# Patient Record
Sex: FEMALE | Race: OTHER | NOT HISPANIC OR LATINO
[De-identification: names, ages, dates, MRNs, and addresses within clinical notes are randomized per-mention and may not be internally consistent; named-entity substitution may affect disease eponyms.]

---

## 2017-08-18 ENCOUNTER — TRANSCRIPTION ENCOUNTER (OUTPATIENT)
Age: 62
End: 2017-08-18

## 2017-11-11 ENCOUNTER — TRANSCRIPTION ENCOUNTER (OUTPATIENT)
Age: 62
End: 2017-11-11

## 2017-12-08 ENCOUNTER — TRANSCRIPTION ENCOUNTER (OUTPATIENT)
Age: 62
End: 2017-12-08

## 2018-01-26 ENCOUNTER — TRANSCRIPTION ENCOUNTER (OUTPATIENT)
Age: 63
End: 2018-01-26

## 2018-08-16 ENCOUNTER — TRANSCRIPTION ENCOUNTER (OUTPATIENT)
Age: 63
End: 2018-08-16

## 2018-08-20 ENCOUNTER — TRANSCRIPTION ENCOUNTER (OUTPATIENT)
Age: 63
End: 2018-08-20

## 2018-10-17 PROBLEM — Z00.00 ENCOUNTER FOR PREVENTIVE HEALTH EXAMINATION: Status: ACTIVE | Noted: 2018-10-17

## 2018-11-02 ENCOUNTER — OUTPATIENT (OUTPATIENT)
Dept: OUTPATIENT SERVICES | Facility: HOSPITAL | Age: 63
LOS: 1 days | End: 2018-11-02
Payer: MEDICARE

## 2018-11-02 ENCOUNTER — APPOINTMENT (OUTPATIENT)
Dept: MRI IMAGING | Facility: CLINIC | Age: 63
End: 2018-11-02
Payer: MEDICARE

## 2018-11-02 DIAGNOSIS — R44.3 HALLUCINATIONS, UNSPECIFIED: ICD-10-CM

## 2018-11-02 PROCEDURE — 70551 MRI BRAIN STEM W/O DYE: CPT | Mod: 26

## 2018-11-02 PROCEDURE — 70551 MRI BRAIN STEM W/O DYE: CPT

## 2019-06-20 ENCOUNTER — INPATIENT (INPATIENT)
Facility: HOSPITAL | Age: 64
LOS: 3 days | Discharge: HOME | DRG: 918 | End: 2019-06-24
Attending: HOSPITALIST | Admitting: HOSPITALIST
Payer: MEDICARE

## 2019-06-20 VITALS
DIASTOLIC BLOOD PRESSURE: 84 MMHG | OXYGEN SATURATION: 95 % | SYSTOLIC BLOOD PRESSURE: 137 MMHG | RESPIRATION RATE: 18 BRPM | HEART RATE: 110 BPM

## 2019-06-20 LAB
ALBUMIN SERPL ELPH-MCNC: 4.4 G/DL — SIGNIFICANT CHANGE UP (ref 3.3–5)
ALP SERPL-CCNC: 115 U/L — SIGNIFICANT CHANGE UP (ref 40–120)
ALT FLD-CCNC: 39 U/L — SIGNIFICANT CHANGE UP (ref 10–45)
ANION GAP SERPL CALC-SCNC: 16 MMOL/L — SIGNIFICANT CHANGE UP (ref 5–17)
APTT BLD: 27.5 SEC — SIGNIFICANT CHANGE UP (ref 27.5–36.3)
AST SERPL-CCNC: 41 U/L — HIGH (ref 10–40)
BASOPHILS # BLD AUTO: 0.1 K/UL — SIGNIFICANT CHANGE UP (ref 0–0.2)
BASOPHILS NFR BLD AUTO: 0.4 % — SIGNIFICANT CHANGE UP (ref 0–2)
BILIRUB SERPL-MCNC: 0.4 MG/DL — SIGNIFICANT CHANGE UP (ref 0.2–1.2)
BLD GP AB SCN SERPL QL: NEGATIVE — SIGNIFICANT CHANGE UP
BUN SERPL-MCNC: 6 MG/DL — LOW (ref 7–23)
CALCIUM SERPL-MCNC: 9.7 MG/DL — SIGNIFICANT CHANGE UP (ref 8.4–10.5)
CHLORIDE SERPL-SCNC: 96 MMOL/L — SIGNIFICANT CHANGE UP (ref 96–108)
CO2 SERPL-SCNC: 22 MMOL/L — SIGNIFICANT CHANGE UP (ref 22–31)
CREAT SERPL-MCNC: 0.95 MG/DL — SIGNIFICANT CHANGE UP (ref 0.5–1.3)
EOSINOPHIL # BLD AUTO: 0.2 K/UL — SIGNIFICANT CHANGE UP (ref 0–0.5)
EOSINOPHIL NFR BLD AUTO: 1.3 % — SIGNIFICANT CHANGE UP (ref 0–6)
GAS PNL BLDV: SIGNIFICANT CHANGE UP
GLUCOSE SERPL-MCNC: 167 MG/DL — HIGH (ref 70–99)
HCT VFR BLD CALC: 35.2 % — SIGNIFICANT CHANGE UP (ref 34.5–45)
HGB BLD-MCNC: 12.1 G/DL — SIGNIFICANT CHANGE UP (ref 11.5–15.5)
INR BLD: 1.07 RATIO — SIGNIFICANT CHANGE UP (ref 0.88–1.16)
LYMPHOCYTES # BLD AUTO: 1.7 K/UL — SIGNIFICANT CHANGE UP (ref 1–3.3)
LYMPHOCYTES # BLD AUTO: 10.1 % — LOW (ref 13–44)
MCHC RBC-ENTMCNC: 33 PG — SIGNIFICANT CHANGE UP (ref 27–34)
MCHC RBC-ENTMCNC: 34.3 GM/DL — SIGNIFICANT CHANGE UP (ref 32–36)
MCV RBC AUTO: 96.3 FL — SIGNIFICANT CHANGE UP (ref 80–100)
MONOCYTES # BLD AUTO: 1.2 K/UL — HIGH (ref 0–0.9)
MONOCYTES NFR BLD AUTO: 7.3 % — SIGNIFICANT CHANGE UP (ref 2–14)
NEUTROPHILS # BLD AUTO: 13.7 K/UL — HIGH (ref 1.8–7.4)
NEUTROPHILS NFR BLD AUTO: 80.9 % — HIGH (ref 43–77)
PLATELET # BLD AUTO: 435 K/UL — HIGH (ref 150–400)
POTASSIUM SERPL-MCNC: 3.2 MMOL/L — LOW (ref 3.5–5.3)
POTASSIUM SERPL-SCNC: 3.2 MMOL/L — LOW (ref 3.5–5.3)
PROT SERPL-MCNC: 7.7 G/DL — SIGNIFICANT CHANGE UP (ref 6–8.3)
PROTHROM AB SERPL-ACNC: 12.3 SEC — SIGNIFICANT CHANGE UP (ref 10–12.9)
RBC # BLD: 3.66 M/UL — LOW (ref 3.8–5.2)
RBC # FLD: 12.8 % — SIGNIFICANT CHANGE UP (ref 10.3–14.5)
RH IG SCN BLD-IMP: POSITIVE — SIGNIFICANT CHANGE UP
SODIUM SERPL-SCNC: 134 MMOL/L — LOW (ref 135–145)
WBC # BLD: 16.9 K/UL — HIGH (ref 3.8–10.5)
WBC # FLD AUTO: 16.9 K/UL — HIGH (ref 3.8–10.5)

## 2019-06-20 PROCEDURE — 71045 X-RAY EXAM CHEST 1 VIEW: CPT | Mod: 26

## 2019-06-20 PROCEDURE — 70450 CT HEAD/BRAIN W/O DYE: CPT | Mod: 26

## 2019-06-20 PROCEDURE — 93010 ELECTROCARDIOGRAM REPORT: CPT

## 2019-06-20 PROCEDURE — 99285 EMERGENCY DEPT VISIT HI MDM: CPT | Mod: GC,25

## 2019-06-20 PROCEDURE — 71260 CT THORAX DX C+: CPT | Mod: 26

## 2019-06-20 PROCEDURE — 72125 CT NECK SPINE W/O DYE: CPT | Mod: 26

## 2019-06-20 PROCEDURE — 70486 CT MAXILLOFACIAL W/O DYE: CPT | Mod: 26

## 2019-06-20 PROCEDURE — 74177 CT ABD & PELVIS W/CONTRAST: CPT | Mod: 26

## 2019-06-20 PROCEDURE — 73060 X-RAY EXAM OF HUMERUS: CPT | Mod: 26,LT

## 2019-06-20 RX ORDER — SODIUM CHLORIDE 9 MG/ML
1000 INJECTION INTRAMUSCULAR; INTRAVENOUS; SUBCUTANEOUS ONCE
Refills: 0 | Status: COMPLETED | OUTPATIENT
Start: 2019-06-20 | End: 2019-06-20

## 2019-06-20 RX ADMIN — SODIUM CHLORIDE 1000 MILLILITER(S): 9 INJECTION INTRAMUSCULAR; INTRAVENOUS; SUBCUTANEOUS at 21:55

## 2019-06-20 NOTE — ED PROVIDER NOTE - PHYSICAL EXAMINATION
Venessa Saldana M.D.:   patient awake alert seen lying on stretcher, c-collar in place .   LUNGS CTAB no wheeze no crackle.   CARD tachycardic no m/r/g.    Abdomen soft NT ND no rebound no guarding no CVA tenderness.   EXT WWP no edema no calf tenderness CV 2+DP/PT bilaterally. +left sided chest wall tenderness chest wall stable pelvis stable no midline ctl spine tenderness  neuro A&Ox3 moving all extremities cn2-12 intact gait normal.    skin warm and dry no rash old bruises to right eye and left upper arm.  HEENT: moist mucous membranes, PERRL, EOMI

## 2019-06-20 NOTE — ED PROVIDER NOTE - CLINICAL SUMMARY MEDICAL DECISION MAKING FREE TEXT BOX
Dr. Dockery Note: fall with reported decreased ms (GCS13) but GCS=15 on ED arrival with signs of old contusion of face and possible overuse/overdose of TCA, will check EKG, ct scan for injuries, and frequent assessment.
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
## 2019-06-20 NOTE — ED PROVIDER NOTE - OBJECTIVE STATEMENT
Venessa Saldana M.D: 64yoF presenting as pre-notified level 2 trauma for unwitnessed fall with obvious head trauma, currently confused. on arrival pt awake states she tripped and fell in the bedroom and hit her head but did not lose consciosus. c/o some left sided chest and arm pain and a minor ehadache. arrives with c-collar in place.  A-intact  b-bl breaths oudns  C- intact pulses, normotensive  D-GCS15, pupils 3mm and reactive  E-small abrasion above left eye.

## 2019-06-20 NOTE — ED PROVIDER NOTE - ATTENDING CONTRIBUTION TO CARE
Pt with fall today, history limited from patient, see trauma flow sheet.  EMS reports bottle of 30 effexor pills bottle empty.  Pt states she took 2 today for "cold symptoms."  Pt with old contusion R face, no c/t/l spine td, clear lungs, slight tachycardia, tremulous with anxiety, abdomen soft, nt.  Level 2 trauma pre-activation given report of GCS13 with head injury (but GCS15 on initial assessment).  Pt with initial pulse ox 95% on RA, dipped down to 92% and 2L NC placed with sats 98%, clear lungs, initial CXR without PTX.
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
## 2019-06-21 DIAGNOSIS — F02.81 DEMENTIA IN OTHER DISEASES CLASSIFIED ELSEWHERE, UNSPECIFIED SEVERITY, WITH BEHAVIORAL DISTURBANCE: ICD-10-CM

## 2019-06-21 DIAGNOSIS — T50.901A POISONING BY UNSPECIFIED DRUGS, MEDICAMENTS AND BIOLOGICAL SUBSTANCES, ACCIDENTAL (UNINTENTIONAL), INITIAL ENCOUNTER: ICD-10-CM

## 2019-06-21 DIAGNOSIS — S09.90XA UNSPECIFIED INJURY OF HEAD, INITIAL ENCOUNTER: ICD-10-CM

## 2019-06-21 DIAGNOSIS — F32.9 MAJOR DEPRESSIVE DISORDER, SINGLE EPISODE, UNSPECIFIED: ICD-10-CM

## 2019-06-21 DIAGNOSIS — F05 DELIRIUM DUE TO KNOWN PHYSIOLOGICAL CONDITION: ICD-10-CM
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
## 2019-06-21 DIAGNOSIS — Z29.9 ENCOUNTER FOR PROPHYLACTIC MEASURES, UNSPECIFIED: ICD-10-CM

## 2019-06-21 DIAGNOSIS — F43.10 POST-TRAUMATIC STRESS DISORDER, UNSPECIFIED: ICD-10-CM

## 2019-06-21 LAB
ANION GAP SERPL CALC-SCNC: 15 MMOL/L — SIGNIFICANT CHANGE UP (ref 5–17)
APAP SERPL-MCNC: <15 UG/ML — SIGNIFICANT CHANGE UP (ref 10–30)
APPEARANCE UR: CLEAR — SIGNIFICANT CHANGE UP
BASOPHILS # BLD AUTO: 0.1 K/UL — SIGNIFICANT CHANGE UP (ref 0–0.2)
BASOPHILS NFR BLD AUTO: 0.6 % — SIGNIFICANT CHANGE UP (ref 0–2)
BILIRUB UR-MCNC: NEGATIVE — SIGNIFICANT CHANGE UP
BUN SERPL-MCNC: 5 MG/DL — LOW (ref 7–23)
CALCIUM SERPL-MCNC: 9.1 MG/DL — SIGNIFICANT CHANGE UP (ref 8.4–10.5)
CHLORIDE SERPL-SCNC: 98 MMOL/L — SIGNIFICANT CHANGE UP (ref 96–108)
CK SERPL-CCNC: 539 U/L — HIGH (ref 25–170)
CO2 SERPL-SCNC: 25 MMOL/L — SIGNIFICANT CHANGE UP (ref 22–31)
COLOR SPEC: SIGNIFICANT CHANGE UP
CREAT SERPL-MCNC: 0.68 MG/DL — SIGNIFICANT CHANGE UP (ref 0.5–1.3)
DIFF PNL FLD: NEGATIVE — SIGNIFICANT CHANGE UP
EOSINOPHIL # BLD AUTO: 0.2 K/UL — SIGNIFICANT CHANGE UP (ref 0–0.5)
EOSINOPHIL NFR BLD AUTO: 2.2 % — SIGNIFICANT CHANGE UP (ref 0–6)
ETHANOL SERPL-MCNC: SIGNIFICANT CHANGE UP MG/DL (ref 0–10)
FOLATE SERPL-MCNC: 18.2 NG/ML — SIGNIFICANT CHANGE UP
GLUCOSE SERPL-MCNC: 98 MG/DL — SIGNIFICANT CHANGE UP (ref 70–99)
GLUCOSE UR QL: NEGATIVE — SIGNIFICANT CHANGE UP
HCT VFR BLD CALC: 33.2 % — LOW (ref 34.5–45)
HGB BLD-MCNC: 11.5 G/DL — SIGNIFICANT CHANGE UP (ref 11.5–15.5)
KETONES UR-MCNC: NEGATIVE — SIGNIFICANT CHANGE UP
LEUKOCYTE ESTERASE UR-ACNC: NEGATIVE — SIGNIFICANT CHANGE UP
LYMPHOCYTES # BLD AUTO: 1.8 K/UL — SIGNIFICANT CHANGE UP (ref 1–3.3)
LYMPHOCYTES # BLD AUTO: 17.9 % — SIGNIFICANT CHANGE UP (ref 13–44)
MAGNESIUM SERPL-MCNC: 2.8 MG/DL — HIGH (ref 1.6–2.6)
MCHC RBC-ENTMCNC: 33.7 PG — SIGNIFICANT CHANGE UP (ref 27–34)
MCHC RBC-ENTMCNC: 34.5 GM/DL — SIGNIFICANT CHANGE UP (ref 32–36)
MCV RBC AUTO: 97.6 FL — SIGNIFICANT CHANGE UP (ref 80–100)
MONOCYTES # BLD AUTO: 1.1 K/UL — HIGH (ref 0–0.9)
MONOCYTES NFR BLD AUTO: 11.1 % — SIGNIFICANT CHANGE UP (ref 2–14)
NEUTROPHILS # BLD AUTO: 6.7 K/UL — SIGNIFICANT CHANGE UP (ref 1.8–7.4)
NEUTROPHILS NFR BLD AUTO: 68.1 % — SIGNIFICANT CHANGE UP (ref 43–77)
NITRITE UR-MCNC: NEGATIVE — SIGNIFICANT CHANGE UP
PH UR: 7 — SIGNIFICANT CHANGE UP (ref 5–8)
PHOSPHATE SERPL-MCNC: 2.2 MG/DL — LOW (ref 2.5–4.5)
PLATELET # BLD AUTO: 393 K/UL — SIGNIFICANT CHANGE UP (ref 150–400)
POTASSIUM SERPL-MCNC: 4.3 MMOL/L — SIGNIFICANT CHANGE UP (ref 3.5–5.3)
POTASSIUM SERPL-SCNC: 4.3 MMOL/L — SIGNIFICANT CHANGE UP (ref 3.5–5.3)
PROT UR-MCNC: NEGATIVE — SIGNIFICANT CHANGE UP
RBC # BLD: 3.4 M/UL — LOW (ref 3.8–5.2)
RBC # FLD: 13 % — SIGNIFICANT CHANGE UP (ref 10.3–14.5)
SALICYLATES SERPL-MCNC: <2 MG/DL — LOW (ref 15–30)
SODIUM SERPL-SCNC: 138 MMOL/L — SIGNIFICANT CHANGE UP (ref 135–145)
SP GR SPEC: 1.02 — SIGNIFICANT CHANGE UP (ref 1.01–1.02)
TSH SERPL-MCNC: 1.15 UIU/ML — SIGNIFICANT CHANGE UP (ref 0.27–4.2)
TSH SERPL-MCNC: 1.27 UIU/ML — SIGNIFICANT CHANGE UP (ref 0.27–4.2)
UROBILINOGEN FLD QL: NEGATIVE — SIGNIFICANT CHANGE UP
VIT B12 SERPL-MCNC: 1512 PG/ML — HIGH (ref 232–1245)
WBC # BLD: 9.9 K/UL — SIGNIFICANT CHANGE UP (ref 3.8–10.5)
WBC # FLD AUTO: 9.9 K/UL — SIGNIFICANT CHANGE UP (ref 3.8–10.5)

## 2019-06-21 PROCEDURE — 99221 1ST HOSP IP/OBS SF/LOW 40: CPT

## 2019-06-21 PROCEDURE — 99223 1ST HOSP IP/OBS HIGH 75: CPT

## 2019-06-21 PROCEDURE — 99222 1ST HOSP IP/OBS MODERATE 55: CPT

## 2019-06-21 RX ORDER — BUPROPION HYDROCHLORIDE 150 MG/1
1 TABLET, EXTENDED RELEASE ORAL
Qty: 0 | Refills: 0 | DISCHARGE

## 2019-06-21 RX ORDER — POTASSIUM CHLORIDE 20 MEQ
40 PACKET (EA) ORAL ONCE
Refills: 0 | Status: COMPLETED | OUTPATIENT
Start: 2019-06-21 | End: 2019-06-21

## 2019-06-21 RX ORDER — MAGNESIUM SULFATE 500 MG/ML
2 VIAL (ML) INJECTION ONCE
Refills: 0 | Status: COMPLETED | OUTPATIENT
Start: 2019-06-21 | End: 2019-06-21

## 2019-06-21 RX ORDER — LANOLIN ALCOHOL/MO/W.PET/CERES
3 CREAM (GRAM) TOPICAL ONCE
Refills: 0 | Status: COMPLETED | OUTPATIENT
Start: 2019-06-21 | End: 2019-06-21

## 2019-06-21 RX ORDER — ENOXAPARIN SODIUM 100 MG/ML
40 INJECTION SUBCUTANEOUS EVERY 24 HOURS
Refills: 0 | Status: DISCONTINUED | OUTPATIENT
Start: 2019-06-21 | End: 2019-06-24

## 2019-06-21 RX ORDER — NICOTINE POLACRILEX 2 MG
1 GUM BUCCAL DAILY
Refills: 0 | Status: DISCONTINUED | OUTPATIENT
Start: 2019-06-21 | End: 2019-06-24

## 2019-06-21 RX ORDER — DIVALPROEX SODIUM 500 MG/1
125 TABLET, DELAYED RELEASE ORAL EVERY 6 HOURS
Refills: 0 | Status: DISCONTINUED | OUTPATIENT
Start: 2019-06-21 | End: 2019-06-24

## 2019-06-21 RX ORDER — VENLAFAXINE HCL 75 MG
2 CAPSULE, EXT RELEASE 24 HR ORAL
Qty: 0 | Refills: 0 | DISCHARGE

## 2019-06-21 RX ORDER — SODIUM,POTASSIUM PHOSPHATES 278-250MG
1 POWDER IN PACKET (EA) ORAL ONCE
Refills: 0 | Status: COMPLETED | OUTPATIENT
Start: 2019-06-21 | End: 2019-06-21

## 2019-06-21 RX ADMIN — Medication 1 TABLET(S): at 21:21

## 2019-06-21 RX ADMIN — Medication 3 MILLIGRAM(S): at 21:21

## 2019-06-21 RX ADMIN — Medication 50 GRAM(S): at 04:02

## 2019-06-21 RX ADMIN — Medication 40 MILLIEQUIVALENT(S): at 02:40

## 2019-06-21 RX ADMIN — ENOXAPARIN SODIUM 40 MILLIGRAM(S): 100 INJECTION SUBCUTANEOUS at 16:34

## 2019-06-21 RX ADMIN — Medication 40 MILLIEQUIVALENT(S): at 05:20

## 2019-06-21 NOTE — BEHAVIORAL HEALTH ASSESSMENT NOTE - NSBHCHARTREVIEWIMAGING_PSY_A_CORE FT
EXAM:  CT MAXILLOFACIAL                        EXAM:  CT 3D RECONSTRUCT W LAURIE                        EXAM:  CT BRAIN                        EXAM:  CT CERVICAL SPINE                        PROCEDURE DATE:  06/20/2019    INTERPRETATION:  HISTORY: Trauma.  COMPARISON: None     TECHNIQUE: Axial noncontrast CT images of the head, cervical spine, and   face were obtained and submitted for interpretation. Sagittal and coronal   reformatted images were provided. Bone and soft tissue windows were   evaluated.    FINDINGS:     CT BRAIN:     There is no acute intracranial mass-effect, hemorrhage, midline shift, or   abnormal extra-axial fluid collection.     Ventricles, sulci, and cisterns are normal in size for the patient'martin   without evidence of hydrocephalus. Basal cisterns are patent.     Paranasal sinuses and mastoid air cells are clear. The calvarium is   intact.   CT CERVICAL SPINE:   Grade 1 retrolisthesis of C4 in relation to C3 and C5. Well-corticated   ossific density along the anterior margin of the C4 vertebral body due to   chronic fracture deformity. There is no prevertebral soft tissue   swelling.   Multilevel spondylosis in the mid cervical spine with disc osteophyte   complex formation and uncovertebral joint/facet degeneration noted.   Degenerative right greater than left C2-C3 fusion.  The atlantodental and atlanto-occipital joints are maintained. Remaining   articular facets and posterior elements alignment is maintained.   Partially imaged biapical pleural parenchymal thickening and ground glass   opacities.  CT FACE  There is no facial or orbital fracture. The globes are intact without   retrobulbar hematoma. The lenses are located bilaterally.  The mandibular condyles are located without fracture. The   temporomandibular joints are intact.   The paranasal sinuses are clear. The nasopharyngeal contours are   unremarkable.  IMPRESSION:   CT BRAIN: No acute intracranial bleeding, mass effect, or shift.   CT CERVICAL SPINE: No acute fracture. Grade 1 retrolisthesis of C4 in   relation to C3 and C5.  CT FACE: No facial or orbital fracture.

## 2019-06-21 NOTE — BEHAVIORAL HEALTH ASSESSMENT NOTE - NSBHCONSULTRECOMMENDOTHER_PSY_A_CORE FT
Continue to rule of causes of delirium:  Recommend to check: RPR  Avoid benzos. opioids (if possible) and anticholinergic as it may worsen confusion  Maintain sleep wake cycle  Provide frequent reorientation and redirection  Family member at bedside if possible  Assess for need for glasses and hearing aid (if applicable)

## 2019-06-21 NOTE — BEHAVIORAL HEALTH ASSESSMENT NOTE - NSBHCHARTREVIEWVS_PSY_A_CORE FT
ICU Vital Signs Last 24 Hrs  T(C): 36.9 (21 Jun 2019 12:28), Max: 36.9 (21 Jun 2019 12:28)  T(F): 98.5 (21 Jun 2019 12:28), Max: 98.5 (21 Jun 2019 12:28)  HR: 88 (21 Jun 2019 12:28) (75 - 110)  BP: 125/74 (21 Jun 2019 12:28) (100/60 - 137/84)  BP(mean): --  ABP: --  ABP(mean): --  RR: 18 (21 Jun 2019 12:28) (18 - 22)  SpO2: 95% (21 Jun 2019 12:28) (95% - 98%)

## 2019-06-21 NOTE — CONSULT NOTE ADULT - ASSESSMENT
64F brought by EMS after unwitnessed fall as level 2 trauma. CT head, c-spine, max-face, chest, abdomen, pelvis, and LUE x-ray ordered    - Will f/u imaging    Trauma Surgery  Pager [phone] 64F brought by EMS after unwitnessed fall as level 2 trauma.     - CT head, c-spine, max-face, chest, abdomen, pelvis, and LUE x-ray negative for acute injury  - Recommend social work and PT consult  - Will follow for tertiary exam  - Discussed with attending    Trauma Surgery  Pager [phone]

## 2019-06-21 NOTE — H&P ADULT - PROBLEM SELECTOR PLAN 1
-per patient's son's concerns, pt was given wellbutrin and effexor for 1 month but has less than <1 week supply left - as per ED note  -QTC was prolonged on admission, check EKG daily  -tele monitoring  -K and Mg repleted in ED, will check daily   -continue with 1:1   -will get psych consult

## 2019-06-21 NOTE — BEHAVIORAL HEALTH ASSESSMENT NOTE - NSBHCHARTREVIEWINVESTIGATE_PSY_A_CORE FT
Ventricular Rate 86 BPM    Atrial Rate 86 BPM    P-R Interval 114 ms    QRS Duration 98 ms    Q-T Interval 430 ms    QTC Calculation(Bezet) 514 ms    P Axis 54 degrees    R Axis 1 degrees    T Axis 31 degrees    Diagnosis Line NORMAL SINUS RHYTHM  CANNOT RULE OUT ANTERIOR INFARCT , AGE UNDETERMINED  ABNORMAL ECG

## 2019-06-21 NOTE — CONSULT NOTE ADULT - SUBJECTIVE AND OBJECTIVE BOX
Level 2 Trauma Activation    64F brought by EMS after unwitnessed fall with head injury. States she tripped in her bedroom and hit her head but was able to ambulate after the fall and did not lose consciousness. She is complaining of headache and left arm pain.    Primary Survey  A - airway intact  B - bilateral breath sounds, placed on supplemental oxygen for saturation 89%  C - initially BP: 137/84, palpable pulses in extremities  D - GCS 15 on arrival  Exposure obtained    Secondary survey  Gen: NAD  HEENT: No scalp hematoma, 1 cm laceration above left eye, right periorbital ecchymosis  Neck: Soft, no crepitus, trachea midline  Chest: L anterior chest wall tenderness, no ecchymosis, deformity,   Abd: Soft, ND, NT, no rebound, no guarding  Ext: All compartments soft, LUE with ecchymosis proximal to elbow, peripheral pulses palpable, moving all extremities, no other deformities, lacerations, abrasions, or signs of injury  Back: No TTP, no palpable runoff, stepoff, or deformity    Labs:                        12.1   16.9  )-----------( 435      ( 2019 22:10 )             35.2     06-20    134<L>  |  96  |  6<L>  ----------------------------<  167<H>  3.2<L>   |  22  |  0.95    Ca    9.7      2019 22:10    TPro  7.7  /  Alb  4.4  /  TBili  0.4  /  DBili  x   /  AST  41<H>  /  ALT  39  /  AlkPhos  115  06-20    PT/INR - ( 2019 22:10 )   PT: 12.3 sec;   INR: 1.07 ratio         PTT - ( 2019 22:10 )  PTT:27.5 sec  Urinalysis Basic - ( 2019 23:55 )    Color: Light Yellow / Appearance: Clear / S.024 / pH: x  Gluc: x / Ketone: Negative  / Bili: Negative / Urobili: Negative   Blood: x / Protein: Negative / Nitrite: Negative   Leuk Esterase: Negative / RBC: x / WBC x   Sq Epi: x / Non Sq Epi: x / Bacteria: x

## 2019-06-21 NOTE — BEHAVIORAL HEALTH ASSESSMENT NOTE - RISK ASSESSMENT
risk factors: Substance abuse history, changes of cognition, poor judgement      Protective factors: No Si/hi, no hx suicide attempts, future oriented, no prior psych admissions.
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
## 2019-06-21 NOTE — ED ADULT NURSE REASSESSMENT NOTE - NS ED NURSE REASSESS COMMENT FT1
Pt straight cath'd under sterile field, 800 mL clear yellow urine drained. Pt tolerated well. Xray notes extravasation in IV from IV contrast. Pt IV checked, flushes without difficulty, draws back blood. No swelling, bruising, tenderness noted to IV site. MD aware.

## 2019-06-21 NOTE — H&P ADULT - PROBLEM SELECTOR PLAN 2
-has been having multiple falls at home  -no fx here on imaging   -will check B12, vitamin D  -PT eval

## 2019-06-21 NOTE — CONSULT NOTE ADULT - ATTENDING COMMENTS
Pt initially seen and evaluated as Level 2 trauma activation. Agree with A/P. All imaging negative for acute traumatic injury. Social work. Will perform tertiary exam today.
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
## 2019-06-21 NOTE — BEHAVIORAL HEALTH ASSESSMENT NOTE - NSBHCHARTREVIEWLAB_PSY_A_CORE FT
CBC Full  -  ( 21 Jun 2019 07:05 )  WBC Count : 9.9 K/uL  RBC Count : 3.40 M/uL  Hemoglobin : 11.5 g/dL  Hematocrit : 33.2 %  Platelet Count - Automated : 393 K/uL  Mean Cell Volume : 97.6 fl  Mean Cell Hemoglobin : 33.7 pg  Mean Cell Hemoglobin Concentration : 34.5 gm/dL  Auto Neutrophil # : 6.7 K/uL  Auto Lymphocyte # : 1.8 K/uL  Auto Monocyte # : 1.1 K/uL  Auto Eosinophil # : 0.2 K/uL  Auto Basophil # : 0.1 K/uL  Auto Neutrophil % : 68.1 %  Auto Lymphocyte % : 17.9 %  Auto Monocyte % : 11.1 %  Auto Eosinophil % : 2.2 %  Auto Basophil % : 0.6 %                   11.5   9.9   )-----------( 393      ( 21 Jun 2019 07:05 )             33.2   06-21    138  |  98  |  5<L>  ----------------------------<  98  4.3   |  25  |  0.68    Ca    9.1      21 Jun 2019 07:04  Phos  2.2     06-21  Mg     2.8     06-21    TPro  7.7  /  Alb  4.4  /  TBili  0.4  /  DBili  x   /  AST  41<H>  /  ALT  39  /  AlkPhos  115  06-20

## 2019-06-21 NOTE — H&P ADULT - PROBLEM SELECTOR PLAN 3
-has history of depression/anxiety/PTSD  -will hold off on continuing effexor and wellbutrin at this time given concern for overdose, until psych sees patient

## 2019-06-21 NOTE — H&P ADULT - HISTORY OF PRESENT ILLNESS
64 year old female who presented to the ED after a fall at home. The patient states that she has been tripping frequently at home and that she fell. She states that she cannot remember all the details of her fall. She noticed after the fall that she had a dark circles around her right eye and discussed this with her son via phone. She then states that she was brought to the ED by a [employer] vehicle, but not an ambulance. She states that she was able to get up after the fall and call for help. She denies any headache, vision changes, nausea, vomiting, diarrhea, dysuria, fevers, chills, chest pain, chest pressure. She is unable to provide any further details or history pertaining to the fall. She states that her son was with her overnight in the ED. I tried contacting him at both [phone] as well as [phone] but I am unable to reach him. 64 year old female who presented to the ED after a fall at home. The patient states that she has been tripping frequently at home and that she fell. She states that she cannot remember all the details of her fall. She noticed after the fall that she had a dark circles around her right eye and discussed this with her son via phone. She then states that she was brought to the ED by a [employer] vehicle, but not an ambulance. She states that she was able to get up after the fall and call for help. She denies any headache, vision changes, nausea, vomiting, diarrhea, dysuria, fevers, chills, chest pain, chest pressure. She is unable to provide any further details or history pertaining to the fall. She states that her son was with her overnight in the ED. I tried contacting him at both [phone] as well as [phone] but I am unable to reach him.     Spoke with the patient's PCP Dr. Judi Hu ([phone]), who relayed that the patient has a history of depression, PTSD, and anxiety, and that she was seen in the office on June 17th for a fall. The patient had been carrying a laundry basket and tripped and fell, hit her head on the bed post. She had darkening around her R eye that was resolving at that time. She was complaining of trouble walking and R leg pain. An x-ray was ordered of the R leg but was never done. Meds verified with pt's pharmacy and PCP.

## 2019-06-21 NOTE — BEHAVIORAL HEALTH ASSESSMENT NOTE - NSBHCONSULTFOLLOWAFTERCARE_PSY_A_CORE FT
pt can f/u at [facility] [phone], pt does not need psych admission at this time. pt can benefit from  referral, home aid services? long term placement?

## 2019-06-21 NOTE — CHART NOTE - NSCHARTNOTEFT_GEN_A_CORE
TERTIARY TRAUMA SURVEY  ------------------------------------------------------------------------------------    Date of TTS:   Time:   Admit Date:    Trauma Activation:   Admit GCS: E-     V-     M-     HPI:  64 year old female who presented to the ED after a fall at home. The patient states that she has been tripping frequently at home and that she fell. She states that she cannot remember all the details of her fall. She noticed after the fall that she had a dark circles around her right eye and discussed this with her son via phone. She then states that she was brought to the ED by a [employer] vehicle, but not an ambulance. She states that she was able to get up after the fall and call for help. She denies any headache, vision changes, nausea, vomiting, diarrhea, dysuria, fevers, chills, chest pain, chest pressure. She is unable to provide any further details or history pertaining to the fall. She states that her son was with her overnight in the ED. I tried contacting him at both [phone] as well as [phone] but I am unable to reach him.     Spoke with the patient's PCP Dr. Judi Hu ([phone]), who relayed that the patient has a history of depression, PTSD, and anxiety, and that she was seen in the office on  for a fall. The patient had been carrying a laundry basket and tripped and fell, hit her head on the bed post. She had darkening around her R eye that was resolving at that time. She was complaining of trouble walking and R leg pain. An x-ray was ordered of the R leg but was never done. Meds verified with pt's pharmacy and PCP. (2019 09:02)      INTERVAL EVENTS: ***    PAST MEDICAL & SURGICAL HISTORY:  Anxiety  Post traumatic stress disorder (PTSD)  Depression    [] No significant past history as reviewed with the patient and family    FAMILY HISTORY:    [] Family history not pertinent as reviewed with the patient and family    ALLERGIES: penicillin (Rash)      CURRENT MEDICATIONS  MEDICATIONS (STANDING): enoxaparin Injectable 40 milliGRAM(s) SubCutaneous every 24 hours  nicotine -   7 mG/24Hr(s) Patch 1 patch Transdermal daily    MEDICATIONS (PRN):diVALproex  milliGRAM(s) Oral every 6 hours PRN severe agitation    -----------------------------------------------------------------------------------    VITAL SIGNS:  T(C): 36.9 (19 @ 12:28), Max: 36.9 (19 @ 12:28)  HR: 88 (19 @ 12:28) (75 - 110)  BP: 125/74 (19 @ 12:28) (100/60 - 137/84)  RR: 18 (19 @ 12:28) (18 - 22)  SpO2: 95% (19 @ 12:28) (95% - 98%)  CAPILLARY BLOOD GLUCOSE      POCT Blood Glucose.: 160 mg/dL (2019 21:51)    Drug Dosing Weight  Height (cm): 157.48 (2019 06:09)  Weight (kg): 63 (2019 06:09)  BMI (kg/m2): 25.4 (2019 06:09)  BSA (m2): 1.64 (2019 06:09)     @ 07:01  -   @ 16:12  --------------------------------------------------------  IN:    Oral Fluid: 360 mL  Total IN: 360 mL    OUT:  Total OUT: 0 mL    Total NET: 360 mL          PHYSICAL EXAM:  ***  General: NAD, Laying in bed comfortably, very conversive.  HEENT: EOMI. Appreciated finger rub bilaterally.   Neck: Soft, supple, full ROM. No cervical or paraspinal tenderness.   Cardio: RRR.   Resp: Good effort, CTAB.   Thorax: No chest wall tenderness.  GI/Abd: Soft, NT/ND.  Vascular: Extremities warm, bilateral radial pulses palpable, bilateral DP/PT palpable.  Skin: Intact, no breakdown.  Musculoskeletal: All 4 extremities moving spontaneously, no limitations. Full ROM of shoulders, elbows, wrists, fingers, knees, ankles bilaterally. No tenderness to palpation of joints or extremities.  Neuro: Strength 5/5 in all extremities bilaterally. Sensation to light touch intact in all extremities bilaterally.     LABS:  CBC ( @ 07:05)                              11.5                           9.9     )----------------(  393        68.1  % Neutrophils, 17.9  % Lymphocytes, ANC: 6.7                                 33.2<L>  CBC ( @ 22:10)                              12.1                           16.9<H>  )----------------(  435<H>     80.9<H>% Neutrophils, 10.1<L>% Lymphocytes, ANC: 13.7<H>                              35.2      BMP ( @ 07:04)             138     |  98      |  5<L>  		Ca++ --      Ca 9.1                ---------------------------------( 98    		Mg 2.8<H>             4.3     |  25      |  0.68  			Ph 2.2<L>  BMP ( 02:19)             --      |  --      |  --    		Ca++ --      Ca --                 ---------------------------------( --    		Mg 1.9                --      |  --      |  --    			Ph --        LFTs ( @ 22:10)      TPro 7.7 / Alb 4.4 / TBili 0.4 / DBili -- / AST 41<H> / ALT 39 / AlkPhos 115    Coags ( @ 22:10)  aPTT 27.5 / INR 1.07 / PT 12.3    Cardiac Markers ( 02:19)     HSTrop: -- / CKMB: -- / CK: 539      VBG ( @ 23:00)     7.44 / 37 / 29 / 25 / 1.4 / 56<L>%     Lactate: 2.3<H>      MICROBIOLOGY:  Urinalysis ( @ 23:55):     Color: Light Yellow / Appearance: Clear / S.024 / pH: 7.0 / Gluc: Negative / Ketones: Negative / Bili: Negative / Urobili: Negative / Protein :Negative / Nitrites: Negative / Leuk.Est: Negative / RBC:  / WBC:  / Sq Epi:  / Non Sq Epi:  / Bacteria            ------------------------------------------------------------------------------------------  RADIOLOGICAL FINDINGS REVIEW:  ***  CXR:   Pelvis Films:    C-Spine Films:   T/L/S Spine Films:   Extremity Films:   Head CT:   C-Spine CT:   Neck CT:   Chest CT:   ABD/Pelvis CT:   Other:     List Injuries Identified to Date:  ***  Depression, unspecified depression type  PTSD (post-traumatic stress disorder)  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  Delirium due to another medical condition  Prophylactic measure: Prophylactic measure  Depression: Depression  Fall, initial encounter: Fall, initial encounter  Overdose: Overdose      List Operative and Interventional Radiological Procedures: ***      Consults (Date):  [] Neurosurgery   [] Orthopedic Surgery  [] Spine Surgery  [] Plastic Surgery  [] ENT  [] Urology  [] PM&R  [] Social Work    INTERPRETATION/ASSESSMENT:   64y girl     PLAN:   -   - TERTIARY TRAUMA SURVEY  ------------------------------------------------------------------------------------    Date of TTS:   Time: 4:25pm   Admit Date:    Trauma Activation: Level 2  Admit GCS: E- 4    V- 5    M-6     HPI:  64 year old female who presented to the ED after a fall at home. The patient states that she has been tripping frequently at home and that she fell. She states that she cannot remember all the details of her fall. She noticed after the fall that she had a dark circles around her right eye and discussed this with her son via phone. She then states that she was brought to the ED by a [employer] vehicle, but not an ambulance. She states that she was able to get up after the fall and call for help. She denies any headache, vision changes, nausea, vomiting, diarrhea, dysuria, fevers, chills, chest pain, chest pressure. She is unable to provide any further details or history pertaining to the fall. She states that her son was with her overnight in the ED. I tried contacting him at both [phone] as well as [phone] but I am unable to reach him.     Spoke with the patient's PCP Dr. Judi Hu ([phone]), who relayed that the patient has a history of depression, PTSD, and anxiety, and that she was seen in the office on  for a fall. The patient had been carrying a laundry basket and tripped and fell, hit her head on the bed post. She had darkening around her R eye that was resolving at that time. She was complaining of trouble walking and R leg pain. An x-ray was ordered of the R leg but was never done. Meds verified with pt's pharmacy and PCP.      INTERVAL EVENTS:    PAST MEDICAL & SURGICAL HISTORY:  Anxiety  Post traumatic stress disorder (PTSD)  Depression    [x] No significant past history as reviewed with the patient and family    FAMILY HISTORY:    [x] Family history not pertinent as reviewed with the patient and family    ALLERGIES: penicillin (Rash)      CURRENT MEDICATIONS  MEDICATIONS (STANDING): enoxaparin Injectable 40 milliGRAM(s) SubCutaneous every 24 hours  nicotine -   7 mG/24Hr(s) Patch 1 patch Transdermal daily    MEDICATIONS (PRN):diVALproex  milliGRAM(s) Oral every 6 hours PRN severe agitation    -----------------------------------------------------------------------------------    VITAL SIGNS:  T(C): 36.9 (19 @ 12:28), Max: 36.9 (19 @ 12:28)  HR: 88 (19 @ 12:28) (75 - 110)  BP: 125/74 (19 @ 12:28) (100/60 - 137/84)  RR: 18 (19 @ 12:28) (18 - 22)  SpO2: 95% (19 @ 12:28) (95% - 98%)  CAPILLARY BLOOD GLUCOSE      POCT Blood Glucose.: 160 mg/dL (2019 21:51)    Drug Dosing Weight  Height (cm): 157.48 (2019 06:09)  Weight (kg): 63 (2019 06:09)  BMI (kg/m2): 25.4 (2019 06:09)  BSA (m2): 1.64 (2019 06:09)     @ 07:01  -   @ 16:12  --------------------------------------------------------  IN:    Oral Fluid: 360 mL  Total IN: 360 mL    OUT:  Total OUT: 0 mL    Total NET: 360 mL          PHYSICAL EXAM:   General: NAD, Laying in bed comfortably, very conversive.  HEENT: 1 cm laceration above left eye, right periorbital ecchymosis EOMI. Appreciated finger rub bilaterally.   Neck: Soft, supple, full ROM. No cervical or paraspinal tenderness.   Cardio: RRR.   Resp: Good effort, CTAB.   Thorax: Mild L anterior chest wall tenderness  GI/Abd: Soft, NT/ND.  Vascular: Extremities warm, bilateral radial pulses palpable, bilateral DP/PT palpable.  Skin: LUE with ecchymosis proximal to elbow otherwise skin is intact, with no breakdown.   Musculoskeletal: All 4 extremities moving spontaneously, no limitations. Full ROM of shoulders, elbows, wrists, fingers, knees, ankles bilaterally. No tenderness to palpation of joints or extremities.  Neuro: Strength 5/5 in all extremities bilaterally. Sensation to light touch intact in all extremities bilaterally.     LABS:  CBC ( @ 07:05)                              11.5                           9.9     )----------------(  393        68.1  % Neutrophils, 17.9  % Lymphocytes, ANC: 6.7                                 33.2<L>  CBC ( @ 22:10)                              12.1                           16.9<H>  )----------------(  435<H>     80.9<H>% Neutrophils, 10.1<L>% Lymphocytes, ANC: 13.7<H>                              35.2      BMP ( @ 07:04)             138     |  98      |  5<L>  		Ca++ --      Ca 9.1                ---------------------------------( 98    		Mg 2.8<H>             4.3     |  25      |  0.68  			Ph 2.2<L>  BMP ( @ 02:19)             --      |  --      |  --    		Ca++ --      Ca --                 ---------------------------------( --    		Mg 1.9                --      |  --      |  --    			Ph --        LFTs ( @ 22:10)      TPro 7.7 / Alb 4.4 / TBili 0.4 / DBili -- / AST 41<H> / ALT 39 / AlkPhos 115    Coags ( @ 22:10)  aPTT 27.5 / INR 1.07 / PT 12.3    Cardiac Markers ( @ 02:19)     HSTrop: -- / CKMB: -- / CK: 539      VBG ( @ 23:00)     7.44 / 37 / 29 / 25 / 1.4 / 56<L>%     Lactate: 2.3<H>      MICROBIOLOGY:  Urinalysis ( @ 23:55):     Color: Light Yellow / Appearance: Clear / S.024 / pH: 7.0 / Gluc: Negative / Ketones: Negative / Bili: Negative / Urobili: Negative / Protein :Negative / Nitrites: Negative / Leuk.Est: Negative / RBC:  / WBC:  / Sq Epi:  / Non Sq Epi:  / Bacteria            ------------------------------------------------------------------------------------------  RADIOLOGICAL FINDINGS REVIEW:  < from: CT Maxillofacial No Cont (19 @ 22:37) >    EXAM:  CT MAXILLOFACIAL                          EXAM:  CT 3D RECONSTRUCT ROSA M SULLIVAN                          EXAM:  CT BRAIN                          EXAM:  CT CERVICAL SPINE                            PROCEDURE DATE:  2019          INTERPRETATION:  HISTORY: Trauma.    COMPARISON: None     TECHNIQUE: Axial noncontrast CT images of the head, cervical spine, and   face were obtained and submitted for interpretation. Sagittal and coronal   reformatted images were provided. Bone and soft tissue windows were   evaluated.    FINDINGS:     CT BRAIN:     There is no acute intracranial mass-effect, hemorrhage, midline shift, or   abnormal extra-axial fluid collection.     Ventricles, sulci, and cisterns are normal in size for the patient'martin   without evidence of hydrocephalus. Basal cisterns are patent.     Paranasal sinuses and mastoid air cells are clear. The calvarium is   intact.     CT CERVICAL SPINE:     Grade 1 retrolisthesis of C4 in relation to C3 and C5. Well-corticated   ossific density along the anterior margin of the C4 vertebral body due to   chronic fracture deformity. There is no prevertebral soft tissue   swelling.     Multilevel spondylosis in the mid cervical spine with disc osteophyte   complex formation and uncovertebral joint/facet degeneration noted.   Degenerative right greater than left C2-C3 fusion.    The atlantodental and atlanto-occipital joints are maintained. Remaining   articular facets and posterior elements alignment is maintained.     Partially imaged biapical pleural parenchymal thickening and ground glass   opacities.    CT FACE    There is no facial or orbital fracture. The globes are intact without   retrobulbar hematoma. The lenses are located bilaterally.    The mandibular condyles are located without fracture. The   temporomandibular joints are intact.     The paranasal sinuses are clear. The nasopharyngeal contours are   unremarkable.    IMPRESSION:     CT BRAIN: No acute intracranial bleeding, mass effect, or shift.     CT CERVICAL SPINE: No acute fracture. Grade 1 retrolisthesis of C4 in   relation to C3 and C5.    CT FACE: No facial or orbital fracture.    < from: Xray Humerus, Left (19 @ 23:04) >    EXAM:  HUMERUS LEFT                            PROCEDURE DATE:  2019            INTERPRETATION:  CLINICAL INFORMATION: Left arm pain status post fall    TECHNIQUE: 2 views of the left arm    COMPARISON: None    FINDINGS:     Extravasation of contrast in the soft tissues adjacent to the antecubital    peripheral line. There is no acute fracture or dislocation. There is   mild-to-moderate left AC joint arthrosis.    IMPRESSION:     Extravasation of contrast in the soft tissues adjacent to the antecubital    peripheral line. Monitor for signs and symptoms of compartment syndrome.   Consider a new left antecubital peripheral line.    Dr. Gaspar discussed these findings with Dr. Saldana on 2019 12:13 AM   with read back.         < end of copied text >    < from: Xray Chest 1 View AP/PA (19 @ 22:04) >    EXAM:  XR CHEST AP OR PA 1V                            PROCEDURE DATE:  2019            INTERPRETATION:  CLINICAL INFORMATION: Trauma.    COMPARISON:  Chest x-ray 2017.    TECHNIQUE:   Frontal radiograph of the chest.     FINDINGS:    Left basilar linear atelectasis. The left lung apex was not captured in   this image. No right pneumothorax. The cardiac silhouette is   unremarkable. No acute osseous abnormality.    IMPRESSION: Left basilar linear atelectasis. The left lung apex was not   captured in this image. No right pneumothorax.      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     PLAN:   - No new injuries on tertiary exam  - CT head, c-spine, max-face, chest, abdomen, pelvis, and LUE x-ray negative for acute injury  - f/u psych recs  - f/u PT recs  - Dispo: as per primary team    Trauma Surgery  Pager [phone]

## 2019-06-21 NOTE — BEHAVIORAL HEALTH ASSESSMENT NOTE - DETAILS
Dementia in Mother Dementia (Mother), Father (Stroke) Adderall abuse from prior ADHD diagnosis. Current PCP denies  committed suicide 3 years ago, Pt found husbands body. Darkening from fall around her R eye Lower back pain
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
## 2019-06-21 NOTE — BEHAVIORAL HEALTH ASSESSMENT NOTE - OTHER PAST PSYCHIATRIC HISTORY (INCLUDE DETAILS REGARDING ONSET, COURSE OF ILLNESS, INPATIENT/OUTPATIENT TREATMENT)
PTSD (found  after completion of suicide), depression, and anxiety, no former psychiatric hospitalizations, currently prescribed Wellbutrin and Bupropion.    No prior hospitalizations, but Dr. Hu (PCP) has tried to encourage pt to seek psychiatric hospitalization on several occasions with no success. no hx suicide attempts.

## 2019-06-22 DIAGNOSIS — M54.5 LOW BACK PAIN: ICD-10-CM

## 2019-06-22 LAB
ANION GAP SERPL CALC-SCNC: 15 MMOL/L — SIGNIFICANT CHANGE UP (ref 5–17)
BUN SERPL-MCNC: 8 MG/DL — SIGNIFICANT CHANGE UP (ref 7–23)
CALCIUM SERPL-MCNC: 9.3 MG/DL — SIGNIFICANT CHANGE UP (ref 8.4–10.5)
CHLORIDE SERPL-SCNC: 104 MMOL/L — SIGNIFICANT CHANGE UP (ref 96–108)
CK SERPL-CCNC: 500 U/L — HIGH (ref 25–170)
CO2 SERPL-SCNC: 23 MMOL/L — SIGNIFICANT CHANGE UP (ref 22–31)
CREAT SERPL-MCNC: 0.57 MG/DL — SIGNIFICANT CHANGE UP (ref 0.5–1.3)
GLUCOSE SERPL-MCNC: 89 MG/DL — SIGNIFICANT CHANGE UP (ref 70–99)
HCV AB S/CO SERPL IA: 0.1 S/CO — SIGNIFICANT CHANGE UP (ref 0–0.99)
HCV AB SERPL-IMP: SIGNIFICANT CHANGE UP
LEAD BLD-MCNC: <1 UG/DL — SIGNIFICANT CHANGE UP (ref 0–4)
MAGNESIUM SERPL-MCNC: 2.3 MG/DL — SIGNIFICANT CHANGE UP (ref 1.6–2.6)
PHOSPHATE SERPL-MCNC: 2.4 MG/DL — LOW (ref 2.5–4.5)
POTASSIUM SERPL-MCNC: 3.9 MMOL/L — SIGNIFICANT CHANGE UP (ref 3.5–5.3)
POTASSIUM SERPL-SCNC: 3.9 MMOL/L — SIGNIFICANT CHANGE UP (ref 3.5–5.3)
SODIUM SERPL-SCNC: 142 MMOL/L — SIGNIFICANT CHANGE UP (ref 135–145)

## 2019-06-22 PROCEDURE — 99233 SBSQ HOSP IP/OBS HIGH 50: CPT

## 2019-06-22 RX ORDER — CYCLOBENZAPRINE HYDROCHLORIDE 10 MG/1
5 TABLET, FILM COATED ORAL ONCE
Refills: 0 | Status: COMPLETED | OUTPATIENT
Start: 2019-06-22 | End: 2019-06-22

## 2019-06-22 RX ORDER — ACETAMINOPHEN 500 MG
650 TABLET ORAL EVERY 6 HOURS
Refills: 0 | Status: DISCONTINUED | OUTPATIENT
Start: 2019-06-22 | End: 2019-06-24

## 2019-06-22 RX ADMIN — CYCLOBENZAPRINE HYDROCHLORIDE 5 MILLIGRAM(S): 10 TABLET, FILM COATED ORAL at 18:27

## 2019-06-22 RX ADMIN — Medication 650 MILLIGRAM(S): at 18:27

## 2019-06-22 RX ADMIN — ENOXAPARIN SODIUM 40 MILLIGRAM(S): 100 INJECTION SUBCUTANEOUS at 17:03

## 2019-06-22 RX ADMIN — DIVALPROEX SODIUM 125 MILLIGRAM(S): 500 TABLET, DELAYED RELEASE ORAL at 10:56

## 2019-06-22 RX ADMIN — Medication 650 MILLIGRAM(S): at 19:20

## 2019-06-22 RX ADMIN — Medication 1 PATCH: at 11:01

## 2019-06-22 RX ADMIN — Medication 1 PATCH: at 19:00

## 2019-06-22 NOTE — SBIRT NOTE ADULT - NSSBIRTDRGACTION/INTER_GEN_A_CORE
RD outreach for continued f/u support. Patient stated that he has been doing well and continues to exercise and choose healthier options when on the road. He stated that he was taking Basaglar in the morning and noticed his fasting numbers were elevated around 170-190 mg/dL. Stated that he went to  refill of prescription and pharmacy told patient to take Basaglar at night. Patient switched and seems to have lower numbers in the 160's mg/dL. Patient asked RD if he could be referred to a psychologist to speak to someone regarding depression. RD stated that she will notify [facility] who can refer him. Patient appreciative. Patient had no other questions/concerns at the time of call and will notify RD if any arise. RD will wait for patient return call to continue f/u. Will stay on care team for now and will notify [facility] of outreach. Positive reinforcement

## 2019-06-22 NOTE — PROGRESS NOTE ADULT - ATTENDING COMMENTS
Karson Das MD  Division of [facility] Medicine  Cell: [phone]  Pager: [phone]  Office: [phone]

## 2019-06-22 NOTE — PROGRESS NOTE ADULT - SUBJECTIVE AND OBJECTIVE BOX
Patient is a 64y old  Female who presents with a chief complaint of fall (2019 11:54)        SUBJECTIVE / OVERNIGHT EVENTS: Patient says she feels ok except some chronic lower back pain. She denies HA, dizziness, CP, SOB, or palpitations. She reports feeling depressed and tearful at times.       MEDICATIONS  (STANDING):  enoxaparin Injectable 40 milliGRAM(s) SubCutaneous every 24 hours  nicotine -   7 mG/24Hr(s) Patch 1 patch Transdermal daily    MEDICATIONS  (PRN):  acetaminophen   Tablet .. 650 milliGRAM(s) Oral every 6 hours PRN Mild Pain (1 - 3)  diVALproex  milliGRAM(s) Oral every 6 hours PRN severe agitation      Vital Signs Last 24 Hrs  T(C): 36.8 (2019 20:40), Max: 36.8 (2019 15:53)  T(F): 98.2 (2019 20:40), Max: 98.2 (2019 15:53)  HR: 82 (2019 20:40) (82 - 90)  BP: 118/59 (2019 20:40) (118/59 - 134/71)  BP(mean): --  RR: 18 (2019 20:40) (18 - 18)  SpO2: 97% (2019 20:40) (96% - 97%)  CAPILLARY BLOOD GLUCOSE        I&O's Summary    2019 07:  -  2019 07:00  --------------------------------------------------------  IN: 840 mL / OUT: 0 mL / NET: 840 mL    2019 07:01  -  2019 21:47  --------------------------------------------------------  IN: 360 mL / OUT: 0 mL / NET: 360 mL          PHYSICAL EXAM:   GENERAL: NAD, well-developed  HEAD:  Right eye bruise.   EYES: Conjunctiva and sclera clear  NECK: Supple  CHEST/LUNG: Clear to auscultation bilaterally; No wheeze  HEART: S1S2 normal. Regular rate and rhythm; No murmurs, rubs, or gallops  ABDOMEN: Soft, Nontender, Nondistended; Bowel sounds present  EXTREMITIES:  No LE edema  PSYCH/Neuro: Awake and answers questions and follows commands. Otherwise appears non-focal. Becomes tearful at times during interview.    SKIN: No rashes or lesions      LABS:                        11.5   9.9   )-----------( 393      ( 2019 07:05 )             33.2     06-    142  |  104  |  8   ----------------------------<  89  3.9   |  23  |  0.57    Ca    9.3      2019 06:02  Phos  2.4       Mg     2.3         TPro  7.7  /  Alb  4.4  /  TBili  0.4  /  DBili  x   /  AST  41<H>  /  ALT  39  /  AlkPhos  115  06-20    PT/INR - ( 2019 22:10 )   PT: 12.3 sec;   INR: 1.07 ratio         PTT - ( 2019 22:10 )  PTT:27.5 sec  CARDIAC MARKERS ( 2019 06:02 )  x     / x     / 500 U/L / x     / x      CARDIAC MARKERS ( 2019 02:19 )  x     / x     / 539 U/L / x     / x          Urinalysis Basic - ( 2019 23:55 )    Color: Light Yellow / Appearance: Clear / S.024 / pH: x  Gluc: x / Ketone: Negative  / Bili: Negative / Urobili: Negative   Blood: x / Protein: Negative / Nitrite: Negative   Leuk Esterase: Negative / RBC: x / WBC x   Sq Epi: x / Non Sq Epi: x / Bacteria: x      Telemetry reviewed by me: Sinus 60-80's. PAT up to 130's for 10 secs.     RADIOLOGY & ADDITIONAL TESTS:    Imaging Personally Reviewed:  Consultant(s) Notes Reviewed:  Psych  Care Discussed with Consultants/Other Providers:

## 2019-06-23 LAB
ANION GAP SERPL CALC-SCNC: 14 MMOL/L — SIGNIFICANT CHANGE UP (ref 5–17)
BUN SERPL-MCNC: 10 MG/DL — SIGNIFICANT CHANGE UP (ref 7–23)
CALCIUM SERPL-MCNC: 9.2 MG/DL — SIGNIFICANT CHANGE UP (ref 8.4–10.5)
CHLORIDE SERPL-SCNC: 104 MMOL/L — SIGNIFICANT CHANGE UP (ref 96–108)
CO2 SERPL-SCNC: 25 MMOL/L — SIGNIFICANT CHANGE UP (ref 22–31)
CREAT SERPL-MCNC: 0.66 MG/DL — SIGNIFICANT CHANGE UP (ref 0.5–1.3)
GLUCOSE SERPL-MCNC: 119 MG/DL — HIGH (ref 70–99)
HCT VFR BLD CALC: 40 % — SIGNIFICANT CHANGE UP (ref 34.5–45)
HGB BLD-MCNC: 12.8 G/DL — SIGNIFICANT CHANGE UP (ref 11.5–15.5)
MAGNESIUM SERPL-MCNC: 2 MG/DL — SIGNIFICANT CHANGE UP (ref 1.6–2.6)
MCHC RBC-ENTMCNC: 31.1 PG — SIGNIFICANT CHANGE UP (ref 27–34)
MCHC RBC-ENTMCNC: 32 GM/DL — SIGNIFICANT CHANGE UP (ref 32–36)
MCV RBC AUTO: 97.3 FL — SIGNIFICANT CHANGE UP (ref 80–100)
PHOSPHATE SERPL-MCNC: 2.7 MG/DL — SIGNIFICANT CHANGE UP (ref 2.5–4.5)
PLATELET # BLD AUTO: 476 K/UL — HIGH (ref 150–400)
POTASSIUM SERPL-MCNC: 3.6 MMOL/L — SIGNIFICANT CHANGE UP (ref 3.5–5.3)
POTASSIUM SERPL-SCNC: 3.6 MMOL/L — SIGNIFICANT CHANGE UP (ref 3.5–5.3)
RBC # BLD: 4.11 M/UL — SIGNIFICANT CHANGE UP (ref 3.8–5.2)
RBC # FLD: 14.5 % — SIGNIFICANT CHANGE UP (ref 10.3–14.5)
SODIUM SERPL-SCNC: 143 MMOL/L — SIGNIFICANT CHANGE UP (ref 135–145)
WBC # BLD: 10.72 K/UL — HIGH (ref 3.8–10.5)
WBC # FLD AUTO: 10.72 K/UL — HIGH (ref 3.8–10.5)

## 2019-06-23 PROCEDURE — 99232 SBSQ HOSP IP/OBS MODERATE 35: CPT

## 2019-06-23 PROCEDURE — 93010 ELECTROCARDIOGRAM REPORT: CPT

## 2019-06-23 RX ORDER — ACETAMINOPHEN 500 MG
975 TABLET ORAL ONCE
Refills: 0 | Status: COMPLETED | OUTPATIENT
Start: 2019-06-23 | End: 2019-06-23

## 2019-06-23 RX ORDER — LIDOCAINE 4 G/100G
1 CREAM TOPICAL ONCE
Refills: 0 | Status: COMPLETED | OUTPATIENT
Start: 2019-06-23 | End: 2019-06-23

## 2019-06-23 RX ADMIN — Medication 650 MILLIGRAM(S): at 08:08

## 2019-06-23 RX ADMIN — ENOXAPARIN SODIUM 40 MILLIGRAM(S): 100 INJECTION SUBCUTANEOUS at 17:39

## 2019-06-23 RX ADMIN — Medication 650 MILLIGRAM(S): at 08:30

## 2019-06-23 RX ADMIN — Medication 1 PATCH: at 11:30

## 2019-06-23 RX ADMIN — LIDOCAINE 1 PATCH: 4 CREAM TOPICAL at 23:19

## 2019-06-23 RX ADMIN — Medication 975 MILLIGRAM(S): at 23:42

## 2019-06-23 RX ADMIN — Medication 650 MILLIGRAM(S): at 17:05

## 2019-06-23 RX ADMIN — Medication 975 MILLIGRAM(S): at 23:12

## 2019-06-23 RX ADMIN — Medication 1 PATCH: at 19:13

## 2019-06-23 RX ADMIN — Medication 650 MILLIGRAM(S): at 16:35

## 2019-06-23 RX ADMIN — Medication 1 PATCH: at 11:22

## 2019-06-23 NOTE — PROGRESS NOTE BEHAVIORAL HEALTH - NSBHCHARTREVIEWIMAGING_PSY_A_CORE FT
< from: CT Head No Cont (06.20.19 @ 22:32) >    CT BRAIN:     There is no acute intracranial mass-effect, hemorrhage, midline shift, or   abnormal extra-axial fluid collection.     Ventricles, sulci, and cisterns are normal in size for the patient'martin   without evidence of hydrocephalus. Basal cisterns are patent.     Paranasal sinuses and mastoid air cells are clear. The calvarium is   intact.     < end of copied text >

## 2019-06-23 NOTE — PROGRESS NOTE BEHAVIORAL HEALTH - NSBHCHARTREVIEWLAB_PSY_A_CORE FT
12.8   10.72 )-----------( 476      ( 23 Jun 2019 08:39 )             40.0     06-23    143  |  104  |  10  ----------------------------<  119<H>  3.6   |  25  |  0.66    Ca    9.2      23 Jun 2019 06:39  Phos  2.7     06-23  Mg     2.0     06-23
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
## 2019-06-23 NOTE — PROGRESS NOTE BEHAVIORAL HEALTH - NSBHCHARTREVIEWVS_PSY_A_CORE FT
Vital Signs Last 24 Hrs  T(C): 36.7 (23 Jun 2019 11:10), Max: 36.8 (22 Jun 2019 20:40)  T(F): 98.1 (23 Jun 2019 11:10), Max: 98.2 (22 Jun 2019 20:40)  HR: 84 (23 Jun 2019 11:10) (82 - 100)  BP: 102/67 (23 Jun 2019 11:10) (102/67 - 127/74)  BP(mean): --  RR: 17 (23 Jun 2019 11:10) (17 - 18)  SpO2: 97% (23 Jun 2019 11:10) (96% - 97%)

## 2019-06-23 NOTE — PROGRESS NOTE ADULT - ATTENDING COMMENTS
Karson Das MD  Division of [facility]  Cell: [phone]  Pager: [phone]  Office: [phone]

## 2019-06-23 NOTE — PROGRESS NOTE BEHAVIORAL HEALTH - NSBHFUPINTERVALHXFT_PSY_A_CORE
Pt seen and examined. She is oriented x4 (but states "that's because they just asked me those questions this morning.") Pt notes she has experienced subjective difficulty with her memory, though improved from the beginning of her hospitalization. Pt continues to endorse depressed mood, becoming tearful and irritable at many points throughout the interview. Pt perseverates on her strained relationships with her children and the anger/resentment she feels toward her  . She endorses guilt over being hostile towards her son. She admits that she misused her medications, which led her to be hospitalized. She feels that she "knows what I did wrong., so I won't do it now." She denies SI stating she would "never do what my  did" because she sees it as weak. She denies AH/VH. Pt notes she wants to get help, but insists that she does not want to be psychiatrically hospitalized, as she is concerned about the stigma. Pt open to idea of PHP.     Collateral obtained from son (Mo [phone]). He states mother had history of depression and had been on an antidepressant prior to death of her  3 years ago, but that her symptoms have worsened since then. After his death, she was prescribed a lot of medication that "basically put her in a coma." Meds included gapapentin, klonopin, adderall. Pt advocated for discontinuing of most meds, until she was only on antidepressants. Son has noted "episodes" when she "ramps up" and has decreased sleep for days at a time. During these episodes, she becomes paranoid and delusional, but after they are over, she has no recollection of these events. It is unclear if these episodes coincide with substance misuse. Son denies history of manic episodes or psychosis prior to a few years ago. He is concerned she has been drinking Red Bull and 5 hr energy drinks in excess, as well as abusing OTC sleeping pills. Pt has limited support, as all her family and friends have  over the past 10 years and Mo is her only support. At baseline, pt is very depressed, but son denies any history of suicidal statements. Though son would like her to be hospitalized, he does not have concern that pt would harm herself or others or be unable to care for herself if discharged. He has an appointment scheduled for her on 7/3 with her outpatient psychiatrist but is willing to see if he can obtain an earlier appointment.

## 2019-06-23 NOTE — PHYSICAL THERAPY INITIAL EVALUATION ADULT - PERTINENT HX OF CURRENT PROBLEM, REHAB EVAL
64 year old female admitted to [facility] on 6/21/19 with history of Depression, anxiety and PTSD admitted after a fall: 64 year old female admitted to [facility] on 6/21/19 with history of Depression, anxiety and PTSD admitted after a fall: CT head (-), CT c/s spine, (-), grade 1 retrolithiasis of C4 in relation to C3 and C5, Xray humerus, CT face (-) CT A/ P (-)

## 2019-06-23 NOTE — PROGRESS NOTE BEHAVIORAL HEALTH - NSBHCHARTREVIEWINVESTIGATE_PSY_A_CORE FT
< from: 12 Lead ECG (06.21.19 @ 02:07) >      Ventricular Rate 86 BPM    Atrial Rate 86 BPM    P-R Interval 114 ms    QRS Duration 98 ms    Q-T Interval 430 ms    QTC Calculation(Bezet) 514 ms    P Axis 54 degrees    R Axis 1 degrees    T Axis 31 degrees    Diagnosis Line NORMAL SINUS RHYTHM  CANNOT RULE OUT ANTERIOR INFARCT , AGE UNDETERMINED  ABNORMAL ECG    < end of copied text >

## 2019-06-23 NOTE — PHYSICAL THERAPY INITIAL EVALUATION ADULT - ADDITIONAL COMMENTS
lives w/ was living w/ son in private home w/ only one step to enter, and now will move to [facility], (-) steps, / no visual deficits, hearing good, R handed

## 2019-06-23 NOTE — CHART NOTE - NSCHARTNOTEFT_GEN_A_CORE
Karson Das | Reference #: [id number]     Others' Prescriptions    Patient Name: Judi Davis YOB: 1955   Address: [address] [place] [address] Sex: Female     Rx Written    Rx Dispensed    Drug    Quantity    Days Supply    Prescriber Name    07/31/2018 08/02/2018 adderall 10 mg tablet  90 30 Angélica Berger     07/21/2018 07/21/2018 dextroamp-amphetamin 10 mg tab  36 12 Angélica Berger     07/11/2018 07/12/2018 adderall 10 mg tablet  36 12 Angélica Berger     07/01/2018 07/01/2018 adderall 10 mg tablet  36 12 Angélica Berger

## 2019-06-23 NOTE — PROGRESS NOTE ADULT - SUBJECTIVE AND OBJECTIVE BOX
Patient is a 64y old  Female who presents with a chief complaint of fall (23 Jun 2019 11:44)        SUBJECTIVE / OVERNIGHT EVENTS: Patient says she still feels depressed. She becomes tearful. She denies CP, SOB, or palpitations. She says she didn't sleep well.       MEDICATIONS  (STANDING):  enoxaparin Injectable 40 milliGRAM(s) SubCutaneous every 24 hours  nicotine -   7 mG/24Hr(s) Patch 1 patch Transdermal daily    MEDICATIONS  (PRN):  acetaminophen   Tablet .. 650 milliGRAM(s) Oral every 6 hours PRN Mild Pain (1 - 3)  diVALproex  milliGRAM(s) Oral every 6 hours PRN severe agitation      Vital Signs Last 24 Hrs  T(C): 36.8 (23 Jun 2019 20:34), Max: 36.8 (23 Jun 2019 00:00)  T(F): 98.2 (23 Jun 2019 20:34), Max: 98.2 (23 Jun 2019 00:00)  HR: 92 (23 Jun 2019 20:34) (83 - 100)  BP: 116/74 (23 Jun 2019 20:34) (102/67 - 127/74)  BP(mean): --  RR: 18 (23 Jun 2019 20:34) (17 - 18)  SpO2: 97% (23 Jun 2019 20:34) (96% - 97%)  CAPILLARY BLOOD GLUCOSE        I&O's Summary    22 Jun 2019 07:01  -  23 Jun 2019 07:00  --------------------------------------------------------  IN: 360 mL / OUT: 0 mL / NET: 360 mL    23 Jun 2019 07:01  -  23 Jun 2019 22:13  --------------------------------------------------------  IN: 840 mL / OUT: 0 mL / NET: 840 mL          PHYSICAL EXAM:   GENERAL: NAD, well-developed  HEAD:  Bruise over right eye.   EYES: Conjunctiva and sclera clear  NECK: Supple  CHEST/LUNG: Clear to auscultation bilaterally; No wheeze  HEART: S1S2 normal. Regular rate and rhythm; No murmurs, rubs, or gallops  ABDOMEN: Soft, Nontender, Nondistended; Bowel sounds present  EXTREMITIES: No LE edema  PSYCH/Neuro: Awake and answers questions and follows commands. Becomes tearful during interview. Oriented x 3+.   SKIN: No rashes or lesions      LABS:                        12.8   10.72 )-----------( 476      ( 23 Jun 2019 08:39 )             40.0     06-23    143  |  104  |  10  ----------------------------<  119<H>  3.6   |  25  |  0.66    Ca    9.2      23 Jun 2019 06:39  Phos  2.7     06-23  Mg     2.0     06-23        CARDIAC MARKERS ( 22 Jun 2019 06:02 )  x     / x     / 500 U/L / x     / x            Telemetry reviewed by me: Sinus 's.     RADIOLOGY & ADDITIONAL TESTS:    Imaging Personally Reviewed:   Consultant(s) Notes Reviewed: Psych  Care Discussed with Consultants/Other Providers: Psych resident Patient is a 64y old  Female who presents with a chief complaint of fall (23 Jun 2019 11:44)        SUBJECTIVE / OVERNIGHT EVENTS: Patient says she still feels depressed. She becomes tearful. She denies CP, SOB, or palpitations. She says she didn't sleep well. She reports her chronic back pain.       MEDICATIONS  (STANDING):  enoxaparin Injectable 40 milliGRAM(s) SubCutaneous every 24 hours  nicotine -   7 mG/24Hr(s) Patch 1 patch Transdermal daily    MEDICATIONS  (PRN):  acetaminophen   Tablet .. 650 milliGRAM(s) Oral every 6 hours PRN Mild Pain (1 - 3)  diVALproex  milliGRAM(s) Oral every 6 hours PRN severe agitation      Vital Signs Last 24 Hrs  T(C): 36.8 (23 Jun 2019 20:34), Max: 36.8 (23 Jun 2019 00:00)  T(F): 98.2 (23 Jun 2019 20:34), Max: 98.2 (23 Jun 2019 00:00)  HR: 92 (23 Jun 2019 20:34) (83 - 100)  BP: 116/74 (23 Jun 2019 20:34) (102/67 - 127/74)  BP(mean): --  RR: 18 (23 Jun 2019 20:34) (17 - 18)  SpO2: 97% (23 Jun 2019 20:34) (96% - 97%)  CAPILLARY BLOOD GLUCOSE        I&O's Summary    22 Jun 2019 07:01  -  23 Jun 2019 07:00  --------------------------------------------------------  IN: 360 mL / OUT: 0 mL / NET: 360 mL    23 Jun 2019 07:01  -  23 Jun 2019 22:13  --------------------------------------------------------  IN: 840 mL / OUT: 0 mL / NET: 840 mL          PHYSICAL EXAM:   GENERAL: NAD, well-developed  HEAD:  Bruise over right eye.   EYES: Conjunctiva and sclera clear  NECK: Supple  CHEST/LUNG: Clear to auscultation bilaterally; No wheeze  HEART: S1S2 normal. Regular rate and rhythm; No murmurs, rubs, or gallops  ABDOMEN: Soft, Nontender, Nondistended; Bowel sounds present  EXTREMITIES: No LE edema  PSYCH/Neuro: Awake and answers questions and follows commands. Becomes tearful during interview. Oriented x 3+.   SKIN: No rashes or lesions      LABS:                        12.8   10.72 )-----------( 476      ( 23 Jun 2019 08:39 )             40.0     06-23    143  |  104  |  10  ----------------------------<  119<H>  3.6   |  25  |  0.66    Ca    9.2      23 Jun 2019 06:39  Phos  2.7     06-23  Mg     2.0     06-23        CARDIAC MARKERS ( 22 Jun 2019 06:02 )  x     / x     / 500 U/L / x     / x            Telemetry reviewed by me: Sinus 's.     RADIOLOGY & ADDITIONAL TESTS:    Imaging Personally Reviewed:   Consultant(s) Notes Reviewed: Psych  Care Discussed with Consultants/Other Providers: Psych resident

## 2019-06-23 NOTE — CHART NOTE - NSCHARTNOTEFT_GEN_A_CORE
CC: lower back pain      HPI:  Notified by RN that patient complaining of lower back pain. Patient seen and assessed at bedside, NAD, alert and awake, tearful at time during the interview. Patient stated she feels "wound up" and upset because there were many visitors in the room earlier. She complained of lower back pain that radiates to the right leg stating "I have learned to deal with my pain." Patient stated she has had back pain for many years but it began to get worse after her  committed suicide 3 years ago. Pain is currently 10/10, constant, "whole", worse with movement. She endorsed numbness of the RLE associated with the pain. She denied weakness but also stated she has been ambulating less while in the hospital. She denied headache, dizziness, chest pain, acute dyspnea, nausea, vomiting, abdominal pain, or diarrhea.         ROS:  CONSTITUTIONAL:  No fever, chills, rigors  CARDIOVASCULAR:  No chest pain or palpitations  RESPIRATORY:   No SOB, cough, dyspnea on exertion.  No wheezing  GASTROINTESTINAL:  No abd pain, N/V, diarrhea/constipation  EXTREMITIES:  No swelling or joint pain  GENITOURINARY:  No burning on urination, increased frequency or urgency.  No flank pain  NEUROLOGIC:  No HA, visual disturbances  SKIN: No rashes        PAST MEDICAL & SURGICAL HISTORY:  Anxiety  Post traumatic stress disorder (PTSD)  Depression          Vital Signs Last 24 Hrs  T(C): 36.8 (23 Jun 2019 20:34), Max: 36.8 (23 Jun 2019 00:00)  T(F): 98.2 (23 Jun 2019 20:34), Max: 98.2 (23 Jun 2019 00:00)  HR: 92 (23 Jun 2019 20:34) (83 - 100)  BP: 116/74 (23 Jun 2019 20:34) (102/67 - 127/74)  RR: 18 (23 Jun 2019 20:34) (17 - 18)  SpO2: 97% (23 Jun 2019 20:34) (96% - 97%)      Physical Exam:  General: WN/WD NAD, AOx3, nontoxic appearing  Head:  NC/AT  CV: RRR, S1S2   Respiratory: CTA B/L, nonlabored  Abdominal: (+) bowel sounds x4. Soft, NT, ND, no palpable mass, no guarding, or rebound tenderness  Genitourinary: ? Martinez   MSK: No BLLE edema, + peripheral pulses, FROM all 4 extremity  Skin: (+) warm, dry   Psych: Appropriate affect       Labs:                        12.8   10.72 )-----------( 476      ( 23 Jun 2019 08:39 )             40.0     06-23    143  |  104  |  10  ----------------------------<  119<H>  3.6   |  25  |  0.66    Ca    9.2      23 Jun 2019 06:39  Phos  2.7     06-23  Mg     2.0     06-23        Urinalysis Basic - ( 06-20 @ 23:55 )  Color: -- / Appearance: Negative / SG: -- / pH: Negative  Gluc: Negative / Ketone: Light Yellow  / Bili: -- / Urobili: --   Blood: -- / Protein: -- / Nitrite: Negative   Leuk Esterase: Negative / RBC: 1.024 / WBC --   Sq Epi: Negative / Non Sq Epi: -- / Bacteria: 7.0            Radiology:          Assessment & Plan:  HPI:  64 year old female who presented to the ED after a fall at home. The patient states that she has been tripping frequently at home and that she fell. She states that she cannot remember all the details of her fall. She noticed after the fall that she had a dark circles around her right eye and discussed this with her son via phone. She then states that she was brought to the ED by a [employer] vehicle, but not an ambulance. She states that she was able to get up after the fall and call for help. She denies any headache, vision changes, nausea, vomiting, diarrhea, dysuria, fevers, chills, chest pain, chest pressure. She is unable to provide any further details or history pertaining to the fall. She states that her son was with her overnight in the ED. I tried contacting him at both [phone] as well as [phone] but I am unable to reach him.     Spoke with the patient's PCP Dr. Judi Hu ([phone]), who relayed that the patient has a history of depression, PTSD, and anxiety, and that she was seen in the office on June 17th for a fall. The patient had been carrying a laundry basket and tripped and fell, hit her head on the bed post. She had darkening around her R eye that was resolving at that time. She was complaining of trouble walking and R leg pain. An x-ray was ordered of the R leg but was never done. Meds verified with pt's pharmacy and PCP. (21 Jun 2019 09:02)      #Chronic lower back pain +/- sciatica  -No focal deficits on exam  -Lidocaine patch  -Acetaminophen 975mg PO x1  -Will continue to closely monitor patient/vitals  -Primary Team to follow up in AM, attending to follow       Sherly Rowell PA-C  Dept of Medicine CC: lower back pain      HPI:  Notified by RN that patient complaining of lower back pain. Patient seen and assessed at bedside, NAD, alert and awake, tearful at time during the interview. Patient stated she feels "wound up" and upset because there were many visitors in the room earlier. She complained of lower back pain that radiates to the right leg stating "I have learned to deal with my pain." Patient stated she has had back pain for many years but it began to get worse after her  committed suicide 3 years ago. Pain is currently 10/10, constant, "whole", worse with movement. She endorsed numbness of the RLE associated with the pain. She denied weakness but also stated she has been ambulating less while in the hospital. She denied headache, dizziness, chest pain, acute dyspnea, nausea, vomiting, abdominal pain, or diarrhea.         ROS:  CONSTITUTIONAL:  No fever, chills, rigors  CARDIOVASCULAR:  No chest pain or palpitations  RESPIRATORY:   No SOB, cough, dyspnea on exertion.  No wheezing  GASTROINTESTINAL:  No abd pain, N/V, diarrhea/constipation  EXTREMITIES:  No swelling or joint pain  GENITOURINARY:  No burning on urination, increased frequency or urgency.  No flank pain  NEUROLOGIC:  No HA, visual disturbances  SKIN: No rashes        PAST MEDICAL & SURGICAL HISTORY:  Anxiety  Post traumatic stress disorder (PTSD)  Depression          Vital Signs Last 24 Hrs  T(C): 36.8 (23 Jun 2019 20:34), Max: 36.8 (23 Jun 2019 00:00)  T(F): 98.2 (23 Jun 2019 20:34), Max: 98.2 (23 Jun 2019 00:00)  HR: 92 (23 Jun 2019 20:34) (83 - 100)  BP: 116/74 (23 Jun 2019 20:34) (102/67 - 127/74)  RR: 18 (23 Jun 2019 20:34) (17 - 18)  SpO2: 97% (23 Jun 2019 20:34) (96% - 97%)      Physical Exam:  General: WN/WD, NAD, AOx3, nontoxic appearing  Head:  (+) right periorbital ecchymosis  CV: RRR, S1S2   Respiratory: CTA B/L, nonlabored  Abdominal: (+) bowel sounds x4. Soft, NT, ND, no palpable mass, no guarding, or rebound tenderness  MSK: No BLLE edema, + peripheral pulses, FROM all 4 extremity  Motor exam: Bulk and tone were normal. Strength was 5/5 in all four extremities.  Sensation: Intact to light touch  Skin: (+) warm, dry   Psych: Tearful at times during interview        Labs:                        12.8   10.72 )-----------( 476      ( 23 Jun 2019 08:39 )             40.0     06-23    143  |  104  |  10  ----------------------------<  119<H>  3.6   |  25  |  0.66    Ca    9.2      23 Jun 2019 06:39  Phos  2.7     06-23  Mg     2.0     06-23        Urinalysis Basic - ( 06-20 @ 23:55 )  Color: -- / Appearance: Negative / SG: -- / pH: Negative  Gluc: Negative / Ketone: Light Yellow  / Bili: -- / Urobili: --   Blood: -- / Protein: -- / Nitrite: Negative   Leuk Esterase: Negative / RBC: 1.024 / WBC --   Sq Epi: Negative / Non Sq Epi: -- / Bacteria: 7.0        Assessment & Plan:  64 year old female with history of Depression, anxiety and PTSD admitted after a fall in setting of taking extra doses of psych meds.     Patient now presenting acutely with complaints of acute/chronic lower back pain. No focal deficits on exam. Patient also noted to have an episode of increased HR to 170s while ambulating to restroom, she was asymptomatic at the time.     #Chronic lower back pain +/- sciatica  -No focal deficits on exam  -Lidocaine patch  -Acetaminophen 975mg PO x1  -Avoid opiates at this time  -Fall precautions   -Consider imaging if back pain persists/worsens/neurological deficits   -Will continue to closely monitor patient/vitals  -Primary Team to follow up in AM, attending to follow       Sherly Rowell PA-C  Dept of Medicine  [id number] CC: lower back pain      HPI:  Notified by RN that patient complaining of lower back pain. Patient seen and assessed at bedside, NAD, alert and awake, tearful at time during the interview. Patient stated she feels "wound up" and upset because there were many visitors in the room earlier. She complained of lower back pain that radiates to the right leg stating "I have learned to deal with my pain." Patient stated she has had back pain for many years but it began to get worse after her  committed suicide 3 years ago. Pain is currently 10/10, constant, "whole", worse with movement. She endorsed numbness of the RLE associated with the pain. She denied weakness but also stated she has been ambulating less while in the hospital. She denied headache, dizziness, chest pain, acute dyspnea, nausea, vomiting, abdominal pain, or diarrhea.         ROS:  CONSTITUTIONAL:  No fever, chills, rigors  CARDIOVASCULAR:  No chest pain or palpitations  RESPIRATORY:   No SOB, cough, wheezing  GASTROINTESTINAL:  No abd pain, N/V/D  NEUROLOGIC:  No HA, visual disturbances  SKIN: No rashes        PAST MEDICAL & SURGICAL HISTORY:  Anxiety  Post traumatic stress disorder (PTSD)  Depression          Vital Signs Last 24 Hrs  T(C): 36.8 (23 Jun 2019 20:34), Max: 36.8 (23 Jun 2019 00:00)  T(F): 98.2 (23 Jun 2019 20:34), Max: 98.2 (23 Jun 2019 00:00)  HR: 92 (23 Jun 2019 20:34) (83 - 100)  BP: 116/74 (23 Jun 2019 20:34) (102/67 - 127/74)  RR: 18 (23 Jun 2019 20:34) (17 - 18)  SpO2: 97% (23 Jun 2019 20:34) (96% - 97%)      Physical Exam:  General: WN/WD, NAD, AOx3, nontoxic appearing  Head:  (+) right periorbital ecchymosis  CV: RRR, S1S2   Respiratory: CTA B/L, nonlabored  Abdominal: (+) bowel sounds x4. Soft, NT, ND, no palpable mass, no guarding, or rebound tenderness  MSK: No BLLE edema, + peripheral pulses, FROM all 4 extremity  Motor exam: Bulk and tone were normal. Strength was 5/5 in all four extremities.  Sensation: Intact to light touch  Skin: (+) warm, dry   Psych: Tearful at times during interview        Labs:                        12.8   10.72 )-----------( 476      ( 23 Jun 2019 08:39 )             40.0     06-23    143  |  104  |  10  ----------------------------<  119<H>  3.6   |  25  |  0.66    Ca    9.2      23 Jun 2019 06:39  Phos  2.7     06-23  Mg     2.0     06-23        Urinalysis Basic - ( 06-20 @ 23:55 )  Color: -- / Appearance: Negative / SG: -- / pH: Negative  Gluc: Negative / Ketone: Light Yellow  / Bili: -- / Urobili: --   Blood: -- / Protein: -- / Nitrite: Negative   Leuk Esterase: Negative / RBC: 1.024 / WBC --   Sq Epi: Negative / Non Sq Epi: -- / Bacteria: 7.0        Assessment & Plan:  64 year old female with history of Depression, anxiety and PTSD admitted after a fall in setting of taking extra doses of psych meds.     Patient now presenting acutely with complaints of acute/chronic lower back pain. No focal deficits on exam. Patient also noted to have an episode of increased HR to 170s while ambulating to restroom, she was asymptomatic at the time.     #Chronic lower back pain +/- sciatica  -No focal deficits on exam  -Lidocaine patch  -Acetaminophen 975mg PO x1  -Avoid opiates at this time  -Fall precautions   -Consider imaging if back pain persists/worsens/neurological deficits   -Will continue to closely monitor patient/vitals  -Primary Team to follow up in AM, attending to follow       Sherly Rowell PA-C  Dept of Medicine  [id number]

## 2019-06-24 ENCOUNTER — TRANSCRIPTION ENCOUNTER (OUTPATIENT)
Age: 64
End: 2019-06-24

## 2019-06-24 VITALS
HEART RATE: 104 BPM | RESPIRATION RATE: 18 BRPM | SYSTOLIC BLOOD PRESSURE: 118 MMHG | OXYGEN SATURATION: 95 % | DIASTOLIC BLOOD PRESSURE: 61 MMHG

## 2019-06-24 LAB
24R-OH-CALCIDIOL SERPL-MCNC: 15 NG/ML — LOW (ref 30–80)
ANION GAP SERPL CALC-SCNC: 15 MMOL/L — SIGNIFICANT CHANGE UP (ref 5–17)
BASOPHILS # BLD AUTO: 0.08 K/UL — SIGNIFICANT CHANGE UP (ref 0–0.2)
BASOPHILS NFR BLD AUTO: 0.9 % — SIGNIFICANT CHANGE UP (ref 0–2)
BUN SERPL-MCNC: 11 MG/DL — SIGNIFICANT CHANGE UP (ref 7–23)
CALCIUM SERPL-MCNC: 9.6 MG/DL — SIGNIFICANT CHANGE UP (ref 8.4–10.5)
CHLORIDE SERPL-SCNC: 105 MMOL/L — SIGNIFICANT CHANGE UP (ref 96–108)
CO2 SERPL-SCNC: 24 MMOL/L — SIGNIFICANT CHANGE UP (ref 22–31)
CREAT SERPL-MCNC: 0.65 MG/DL — SIGNIFICANT CHANGE UP (ref 0.5–1.3)
EOSINOPHIL # BLD AUTO: 0.27 K/UL — SIGNIFICANT CHANGE UP (ref 0–0.5)
EOSINOPHIL NFR BLD AUTO: 2.9 % — SIGNIFICANT CHANGE UP (ref 0–6)
GLUCOSE SERPL-MCNC: 112 MG/DL — HIGH (ref 70–99)
HCT VFR BLD CALC: 40.2 % — SIGNIFICANT CHANGE UP (ref 34.5–45)
HGB BLD-MCNC: 13 G/DL — SIGNIFICANT CHANGE UP (ref 11.5–15.5)
IMM GRANULOCYTES NFR BLD AUTO: 0.8 % — SIGNIFICANT CHANGE UP (ref 0–1.5)
LYMPHOCYTES # BLD AUTO: 2.67 K/UL — SIGNIFICANT CHANGE UP (ref 1–3.3)
LYMPHOCYTES # BLD AUTO: 28.6 % — SIGNIFICANT CHANGE UP (ref 13–44)
MCHC RBC-ENTMCNC: 31.2 PG — SIGNIFICANT CHANGE UP (ref 27–34)
MCHC RBC-ENTMCNC: 32.3 GM/DL — SIGNIFICANT CHANGE UP (ref 32–36)
MCV RBC AUTO: 96.4 FL — SIGNIFICANT CHANGE UP (ref 80–100)
MONOCYTES # BLD AUTO: 0.92 K/UL — HIGH (ref 0–0.9)
MONOCYTES NFR BLD AUTO: 9.9 % — SIGNIFICANT CHANGE UP (ref 2–14)
NEUTROPHILS # BLD AUTO: 5.31 K/UL — SIGNIFICANT CHANGE UP (ref 1.8–7.4)
NEUTROPHILS NFR BLD AUTO: 56.9 % — SIGNIFICANT CHANGE UP (ref 43–77)
PLATELET # BLD AUTO: 499 K/UL — HIGH (ref 150–400)
POTASSIUM SERPL-MCNC: 3.7 MMOL/L — SIGNIFICANT CHANGE UP (ref 3.5–5.3)
POTASSIUM SERPL-SCNC: 3.7 MMOL/L — SIGNIFICANT CHANGE UP (ref 3.5–5.3)
RBC # BLD: 4.17 M/UL — SIGNIFICANT CHANGE UP (ref 3.8–5.2)
RBC # FLD: 14.6 % — HIGH (ref 10.3–14.5)
SODIUM SERPL-SCNC: 144 MMOL/L — SIGNIFICANT CHANGE UP (ref 135–145)
WBC # BLD: 9.32 K/UL — SIGNIFICANT CHANGE UP (ref 3.8–10.5)
WBC # FLD AUTO: 9.32 K/UL — SIGNIFICANT CHANGE UP (ref 3.8–10.5)

## 2019-06-24 PROCEDURE — 83655 ASSAY OF LEAD: CPT

## 2019-06-24 PROCEDURE — 84100 ASSAY OF PHOSPHORUS: CPT

## 2019-06-24 PROCEDURE — 85027 COMPLETE CBC AUTOMATED: CPT

## 2019-06-24 PROCEDURE — 74177 CT ABD & PELVIS W/CONTRAST: CPT

## 2019-06-24 PROCEDURE — 86900 BLOOD TYPING SEROLOGIC ABO: CPT

## 2019-06-24 PROCEDURE — 76377 3D RENDER W/INTRP POSTPROCES: CPT

## 2019-06-24 PROCEDURE — 80053 COMPREHEN METABOLIC PANEL: CPT

## 2019-06-24 PROCEDURE — 82607 VITAMIN B-12: CPT

## 2019-06-24 PROCEDURE — 70450 CT HEAD/BRAIN W/O DYE: CPT

## 2019-06-24 PROCEDURE — 84132 ASSAY OF SERUM POTASSIUM: CPT

## 2019-06-24 PROCEDURE — 82947 ASSAY GLUCOSE BLOOD QUANT: CPT

## 2019-06-24 PROCEDURE — 71045 X-RAY EXAM CHEST 1 VIEW: CPT

## 2019-06-24 PROCEDURE — 82550 ASSAY OF CK (CPK): CPT

## 2019-06-24 PROCEDURE — 99232 SBSQ HOSP IP/OBS MODERATE 35: CPT

## 2019-06-24 PROCEDURE — 83605 ASSAY OF LACTIC ACID: CPT

## 2019-06-24 PROCEDURE — 81003 URINALYSIS AUTO W/O SCOPE: CPT

## 2019-06-24 PROCEDURE — 86901 BLOOD TYPING SEROLOGIC RH(D): CPT

## 2019-06-24 PROCEDURE — 82435 ASSAY OF BLOOD CHLORIDE: CPT

## 2019-06-24 PROCEDURE — 84443 ASSAY THYROID STIM HORMONE: CPT

## 2019-06-24 PROCEDURE — 82565 ASSAY OF CREATININE: CPT

## 2019-06-24 PROCEDURE — 85730 THROMBOPLASTIN TIME PARTIAL: CPT

## 2019-06-24 PROCEDURE — 80307 DRUG TEST PRSMV CHEM ANLYZR: CPT

## 2019-06-24 PROCEDURE — 99239 HOSP IP/OBS DSCHRG MGMT >30: CPT

## 2019-06-24 PROCEDURE — 82306 VITAMIN D 25 HYDROXY: CPT

## 2019-06-24 PROCEDURE — 70486 CT MAXILLOFACIAL W/O DYE: CPT

## 2019-06-24 PROCEDURE — 86850 RBC ANTIBODY SCREEN: CPT

## 2019-06-24 PROCEDURE — 93010 ELECTROCARDIOGRAM REPORT: CPT

## 2019-06-24 PROCEDURE — 71260 CT THORAX DX C+: CPT

## 2019-06-24 PROCEDURE — 97161 PT EVAL LOW COMPLEX 20 MIN: CPT

## 2019-06-24 PROCEDURE — 36415 COLL VENOUS BLD VENIPUNCTURE: CPT

## 2019-06-24 PROCEDURE — 82330 ASSAY OF CALCIUM: CPT

## 2019-06-24 PROCEDURE — 84295 ASSAY OF SERUM SODIUM: CPT

## 2019-06-24 PROCEDURE — 99285 EMERGENCY DEPT VISIT HI MDM: CPT | Mod: 25

## 2019-06-24 PROCEDURE — 82962 GLUCOSE BLOOD TEST: CPT

## 2019-06-24 PROCEDURE — 72125 CT NECK SPINE W/O DYE: CPT

## 2019-06-24 PROCEDURE — 86803 HEPATITIS C AB TEST: CPT

## 2019-06-24 PROCEDURE — 82803 BLOOD GASES ANY COMBINATION: CPT

## 2019-06-24 PROCEDURE — 80048 BASIC METABOLIC PNL TOTAL CA: CPT

## 2019-06-24 PROCEDURE — 85610 PROTHROMBIN TIME: CPT

## 2019-06-24 PROCEDURE — 73060 X-RAY EXAM OF HUMERUS: CPT

## 2019-06-24 PROCEDURE — 51701 INSERT BLADDER CATHETER: CPT

## 2019-06-24 PROCEDURE — 82746 ASSAY OF FOLIC ACID SERUM: CPT

## 2019-06-24 PROCEDURE — 85014 HEMATOCRIT: CPT

## 2019-06-24 PROCEDURE — 83735 ASSAY OF MAGNESIUM: CPT

## 2019-06-24 PROCEDURE — 93005 ELECTROCARDIOGRAM TRACING: CPT | Mod: 76

## 2019-06-24 RX ORDER — CHOLECALCIFEROL (VITAMIN D3) 125 MCG
1000 CAPSULE ORAL
Qty: 0 | Refills: 0 | DISCHARGE
Start: 2019-06-24

## 2019-06-24 RX ORDER — IBUPROFEN 200 MG
600 TABLET ORAL ONCE
Refills: 0 | Status: COMPLETED | OUTPATIENT
Start: 2019-06-24 | End: 2019-06-24

## 2019-06-24 RX ORDER — CHOLECALCIFEROL (VITAMIN D3) 125 MCG
1000 CAPSULE ORAL DAILY
Refills: 0 | Status: DISCONTINUED | OUTPATIENT
Start: 2019-06-24 | End: 2019-06-24

## 2019-06-24 RX ADMIN — LIDOCAINE 1 PATCH: 4 CREAM TOPICAL at 11:41

## 2019-06-24 RX ADMIN — Medication 1 PATCH: at 11:41

## 2019-06-24 RX ADMIN — Medication 600 MILLIGRAM(S): at 11:40

## 2019-06-24 RX ADMIN — Medication 1 PATCH: at 09:09

## 2019-06-24 RX ADMIN — Medication 600 MILLIGRAM(S): at 12:40

## 2019-06-24 RX ADMIN — Medication 1 PATCH: at 11:42

## 2019-06-24 RX ADMIN — LIDOCAINE 1 PATCH: 4 CREAM TOPICAL at 09:08

## 2019-06-24 RX ADMIN — Medication 1000 UNIT(S): at 17:20

## 2019-06-24 NOTE — PROGRESS NOTE BEHAVIORAL HEALTH - SUMMARY
This is a 65 yo  female, domiciled alone in [place], with 2 living children,  ( committed suicide 3 years ago), retired (former seamstress), with PMH significant for chronic back pain; +PPH for PTSD (found  after completion of suicide), depression, and anxiety, no former psychiatric hospitalizations, currently prescribed Wellbutrin and Bupropion;  Pt presented to the ED after a fall at home. [facility] Psychiatry C/L was consulted after patient requested to speak to psychiatry, confusion, disorganized behavior as per team     Pt had been misusing medication (wellbutrin, bupropion, pain meds, sleeping pills) in order to "relieve mental pain." Pt's PCP and son also indicated past history of substance abuse (Adderall). On admission, pt was initially confused, disoriented, paranoid. In the setting of normal CT, normal UA, normal TSH, elevated B12, etc, pt most likely experiencing exacerbation of cognitive deficits due to drug misuse in the setting of early onset dementia.
This is a 65 yo  female, domiciled alone in [place], with 2 living children,  ( committed suicide 3 years ago), retired (former seamstress), with PMH significant for chronic back pain; +PPH for PTSD (found  after completion of suicide), depression, and anxiety, no former psychiatric hospitalizations, currently prescribed Wellbutrin and Bupropion;  Pt presented to the ED after a fall at home. [facility] Psychiatry C/L was consulted after patient requested to speak to psychiatry, confusion, disorganized behavior as per team     Pt had been misusing medication (wellbutrin, bupropion, pain meds, sleeping pills) in order to "relieve mental pain." Pt's PCP and son also indicated past history of substance abuse (Adderall). On admission, pt was initially confused, disoriented, paranoid. In the setting of normal CT, normal UA, normal TSH, elevated B12, etc, pt most likely experiencing exacerbation of cognitive deficits due to drug misuse in the setting of early onset dementia. Cognition now improved, but pt endorsing depression, labile mood. Pt denies SI and HI currently. Pt's PCP has encouraged pt to seek psychiatric hospitalization on several occasions with no success. Pt would benefit from voluntary admission. Pt currently refusing, but amenable to PHP. Will hold for reassessment as patient's family will speak to patient tonight and encourage hospitalization. If she refuses, family agreeable to make outpatient appt asap.

## 2019-06-24 NOTE — DISCHARGE NOTE PROVIDER - HOSPITAL COURSE
65 yo  female, domiciled alone in [place], with 2 living children,  ( committed suicide 3 years ago), retired (former seamstress), with PMH significant for chronic back pain; +PPH for PTSD (found  after completion of suicide), depression, and anxiety, no former psychiatric hospitalizations, currently prescribed Wellbutrin and Bupropion;  Pt presented to the ED after a fall at home.        Pt had been misusing medication (wellbutrin, bupropion, pain meds, sleeping pills) in order to "relieve mental pain." Pt's PCP and son also indicated past history of substance abuse (Adderall). On admission, pt was initially confused, disoriented, paranoid. In the setting of normal CT, normal UA, normal TSH, elevated B12, etc, pt most likely experiencing exacerbation of cognitive deficits due to drug misuse in the setting of early onset dementia.            Medically cleared for discharge    Cleared by Psych for discharge home - out patient follow up with private psych 65 yo  female, domiciled alone in [place], with 2 living children,  ( committed suicide 3 years ago), retired (former seamstress), with PMH significant for chronic back pain; +PPH for PTSD (found  after completion of suicide), depression, and anxiety, no former psychiatric hospitalizations, currently prescribed Wellbutrin and Bupropion;  Pt presented to the ED after a fall at home.        Pt had been misusing medication (wellbutrin, bupropion, pain meds, sleeping pills) in order to "relieve mental pain." Pt's PCP and son also indicated past history of substance abuse (Adderall). On admission, pt was initially confused, disoriented, paranoid. In the setting of normal CT, normal UA, normal TSH, elevated B12, etc, pt most likely experiencing exacerbation of cognitive deficits due to drug misuse in the setting of early onset dementia.            Medically cleared for discharge after monitoring on telemetry for QTc prolongation. Remaining metabolic/infectious work-up unrevealing. Chronic lower back pain treated with non-narcotic therapies such as acetaminophen and lidoderm patch. Seen by PT, recommending no needs at this time.    Cleared by Psych for discharge home - out patient follow up with private psych, Dr. Berger

## 2019-06-24 NOTE — PROGRESS NOTE BEHAVIORAL HEALTH - NSBHFUPINTERVALHXFT_PSY_A_CORE
Pt seen and examined. pt calm, denies paranoia, hallucinations. pt feels safe in the hospital. pt denies depression, no si/i. pt anxious to get back home. no prns given.

## 2019-06-24 NOTE — PROVIDER CONTACT NOTE (OTHER) - ASSESSMENT
Pt VSS. Pt c/o pain in back that radiates to legs. Pt states this is chronic pain and the previous doses of TYlenol do not relieve the pain.
Pt was ambulating to bathroom during event, pt denies any dizziness, lightheadedness or chest pain. VSS, please see flowsheet. Pt returned to SR 70-80's after event.
Pt AAOx3. VSS. Pt denies cp, SOB and palpitations. Pt was walking to the bathroom and RN was notified about an increase in the pts HR.

## 2019-06-24 NOTE — PROGRESS NOTE ADULT - PROBLEM SELECTOR PLAN 5
-lovenox for dvt ppx    No medical contraindication to discharge planning. Await psychiatry input regarding need for involuntary admission vs. outpatient f/u. If plan is for home, will await final PT recommendations and ensure close outpatient f/u.  Discharge Time: 40 minutes
-lovenox for dvt ppx
-lovenox for dvt ppx

## 2019-06-24 NOTE — PROGRESS NOTE ADULT - PROBLEM SELECTOR PLAN 1
-Per patient's son's concerns, pt was given wellbutrin and effexor for 1 month but had less than <1 week supply left - as per ED note.   -Patient admits she was taking extra pills to give herself "a boost", but denies trying to overdose or commit suicide.   -QTc was prolonged on admission; tele monitoring today in NSR  -Monitor lytes and replete PRN.   -continue with enhanced supervision for now  -F/u psych recs. Will continue to observe for now. May need voluntary inpatient psych admission if she agrees.
-Per patient's son's concerns, pt was given wellbutrin and effexor for 1 month but had less than <1 week supply left - as per ED note.   -Patient admits she was taking extra pills to give herself "a boost", but denies trying to overdose or commit suicide.   -QTc was prolonged on admission, check EKG daily  -tele monitoring  -Monitor lytes and replete PRN.   -continue with 1:1 for now  -F/u psych recs.
-Per patient's son's concerns, pt was given wellbutrin and effexor for 1 month but had less than <1 week supply left - as per ED note.   -Patient admits she was taking extra pills to give herself "a boost", but denies trying to overdose or commit suicide.   -QTc was prolonged on admission, check EKG daily  -tele monitoring  -Monitor lytes and replete PRN.   -continue with enhanced supervision for now  -F/u psych recs. Will continue to observe for now. May need voluntary inpatient psych admission if she agrees.

## 2019-06-24 NOTE — PROGRESS NOTE BEHAVIORAL HEALTH - SECONDARY DX1
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

## 2019-06-24 NOTE — PROGRESS NOTE ADULT - SUBJECTIVE AND OBJECTIVE BOX
[facility] Division of Hospital Medicine  Chris Denny MD  Pager (LANETTE-MELANIE, 3P-5P): [phone]  Other Times:  [phone]    Patient is a 64y old  Female who presents with a chief complaint of fall (23 Jun 2019 11:44)    SUBJECTIVE / OVERNIGHT EVENTS: Overnight, continued to complain of chronic lower back pain. Received tylenol and lidoderm patch without significant relief. That being said, notes that her sleep is improving. Denies numbness/tingling in lower extremities. No change in bowel/bladder habits. No chest pain. No SOB. No fever/chills.    MEDICATIONS  (STANDING):  enoxaparin Injectable 40 milliGRAM(s) SubCutaneous every 24 hours  nicotine -   7 mG/24Hr(s) Patch 1 patch Transdermal daily    MEDICATIONS  (PRN):  acetaminophen   Tablet .. 650 milliGRAM(s) Oral every 6 hours PRN Mild Pain (1 - 3)  diVALproex  milliGRAM(s) Oral every 6 hours PRN severe agitation      CAPILLARY BLOOD GLUCOSE        I&O's Summary    23 Jun 2019 07:01  -  24 Jun 2019 07:00  --------------------------------------------------------  IN: 1080 mL / OUT: 0 mL / NET: 1080 mL    24 Jun 2019 07:01  -  24 Jun 2019 10:48  --------------------------------------------------------  IN: 300 mL / OUT: 0 mL / NET: 300 mL        PHYSICAL EXAM:  Vital Signs Last 24 Hrs  T(C): 36.9 (24 Jun 2019 05:23), Max: 36.9 (24 Jun 2019 05:23)  T(F): 98.4 (24 Jun 2019 05:23), Max: 98.4 (24 Jun 2019 05:23)  HR: 81 (24 Jun 2019 05:23) (81 - 92)  BP: 110/69 (24 Jun 2019 05:23) (102/67 - 116/74)  BP(mean): --  RR: 17 (24 Jun 2019 05:23) (17 - 18)  SpO2: 97% (24 Jun 2019 05:23) (96% - 97%)  GENERAL: NAD, well-developed, comfortable in bed  EYES: EOMI, conjunctiva and sclera clear  NECK: Supple, No JVD  CHEST/LUNG: Clear to auscultation bilaterally; No wheeze  HEART: Regular rate and rhythm; No murmurs, rubs, or gallops  ABDOMEN: Soft, Nontender, Nondistended; Bowel sounds present  EXTREMITIES:  2+ Peripheral Pulses, No clubbing, cyanosis, or edema  PSYCH: AAOx3, somewhat anxious, but answering questions appropriately. Denies SI/HI. Denies hallucinations.  NEUROLOGY: non-focal, moves all four extremities.    LABS:                        13.0   9.32  )-----------( 499      ( 24 Jun 2019 08:21 )             40.2     06-24    144  |  105  |  11  ----------------------------<  112<H>  3.7   |  24  |  0.65    Ca    9.6      24 Jun 2019 05:41  Phos  2.7     06-23  Mg     2.0     06-23    RADIOLOGY & ADDITIONAL TESTS:    Imaging Personally Reviewed:    Consultant(s) Notes Reviewed:      Care Discussed with Consultants/Other Providers: KATIE Castro

## 2019-06-24 NOTE — PROVIDER CONTACT NOTE (OTHER) - SITUATION
Pt HR increased to 170s upon ambulation to bathroom. HR sustained 170s for 10 seconds then went to 130s. Pt HR increased to 170s upon ambulation to bathroom. HR sustained 170s for 10 seconds then went to 130s. Pt has a hx of increased HR during ambulation this admission.

## 2019-06-24 NOTE — PROGRESS NOTE ADULT - PROBLEM SELECTOR PLAN 3
-has history of depression/anxiety/PTSD  -will hold off on continuing effexor and wellbutrin at this time given concern for overdose  -F/u with psych regarding when/what to resume/start.   -Depakote PRN for agitation.  -Nicotine patch for history of smoking and smoking cessation counseling.
-has history of depression/anxiety/PTSD  -will hold off on continuing effexor and wellbutrin at this time given concern for overdose  -F/u with psych regarding when/what to resume/start.   -Depakote PRN for agitation.
-has history of depression/anxiety/PTSD  -will hold off on continuing effexor and wellbutrin at this time given concern for overdose  -F/u with psych regarding when/what to resume/start.   -Depakote PRN for agitation.  -Nicotine patch for history of smoking and smoking cessation counseling.

## 2019-06-24 NOTE — PROGRESS NOTE BEHAVIORAL HEALTH - NSBHCONSULTMEDAGITATION_PSY_A_CORE FT
depakote 125mg po/iv q6hr prn severe agitation due to prolonged qtc. rec recheck ekg.
depakote 125mg po/iv q6hr prn severe agitation due to prolonged qtc. rec recheck ekg.

## 2019-06-24 NOTE — DISCHARGE NOTE PROVIDER - NSDCCPCAREPLAN_GEN_ALL_CORE_FT
PRINCIPAL DISCHARGE DIAGNOSIS  Diagnosis: Fall, initial encounter  Assessment and Plan of Treatment: Fall, initial encounter      SECONDARY DISCHARGE DIAGNOSES  Diagnosis: Medication overdose, undetermined intent, initial encounter  Assessment and Plan of Treatment:     Diagnosis: Depression, unspecified depression type  Assessment and Plan of Treatment:     Diagnosis: Chronic low back pain, unspecified back pain laterality, unspecified whether sciatica present  Assessment and Plan of Treatment: Chronic low back pain, unspecified back pain laterality, unspecified whether sciatica present

## 2019-06-24 NOTE — PROGRESS NOTE BEHAVIORAL HEALTH - NSBHCHARTREVIEWLAB_PSY_A_CORE FT
13.0   9.32  )-----------( 499      ( 24 Jun 2019 08:21 )             40.2     06-24    144  |  105  |  11  ----------------------------<  112<H>  3.7   |  24  |  0.65    Ca    9.6      24 Jun 2019 05:41  Phos  2.7     06-23  Mg     2.0     06-23

## 2019-06-24 NOTE — PROVIDER CONTACT NOTE (OTHER) - ACTION/TREATMENT ORDERED:
NP aware. NP assessed pt. Order for 1X 975 mg Tylenol PO and Lidocaine patch. Will continue to monitor.
Will continue to monitor patient.
PA made aware. Will continue to monitor.

## 2019-06-24 NOTE — PROGRESS NOTE BEHAVIORAL HEALTH - RISK ASSESSMENT
Risk factors: Substance abuse history, changes of cognition, impulsivity, poor judgment, active mood episode, hopelessness, lack of social support  Protective factors: No Si/hi, no hx suicide attempts, future oriented, no prior psych admissions, good therapeutic alliance    pt overall low risk for suicide attempt
Risk factors: Substance abuse history, changes of cognition, impulsivity, poor judgment, active mood episode, hopelessness, lack of social support  Protective factors: No Si/hi, no hx suicide attempts, future oriented, no prior psych admissions, good therapeutic alliance

## 2019-06-24 NOTE — DISCHARGE NOTE PROVIDER - NSDCFUADDAPPT_GEN_ALL_CORE_FT
Make appointments to follow up with your physicians - bring all discharge paperwork including discharge medication list to follow up appointments

## 2019-06-24 NOTE — PROVIDER CONTACT NOTE (OTHER) - BACKGROUND
Pt admitted for unwitnessed fall at home.
Pt admitted for unwitnessed fall at home.
Pt admitted with Injury of the head, PTSD and depression, as well as possible overdose.

## 2019-06-24 NOTE — PROGRESS NOTE ADULT - PROBLEM SELECTOR PROBLEM 4
Chronic low back pain, unspecified back pain laterality, unspecified whether sciatica present

## 2019-06-24 NOTE — PROGRESS NOTE ADULT - ASSESSMENT
64 year old female with history of Depression, anxiety, PTSD and chronic LBP admitted after a fall in setting of taking extra doses of psych meds.
64 year old female with history of Depression, anxiety and PTSD admitted after a fall in setting of taking extra doses of psych meds.
64 year old female with history of Depression, anxiety and PTSD admitted after a fall in setting of taking extra doses of psych meds.

## 2019-06-24 NOTE — PROGRESS NOTE BEHAVIORAL HEALTH - NSBHCHARTREVIEWVS_PSY_A_CORE FT
Vital Signs Last 24 Hrs  T(C): 36.9 (24 Jun 2019 05:23), Max: 36.9 (24 Jun 2019 05:23)  T(F): 98.4 (24 Jun 2019 05:23), Max: 98.4 (24 Jun 2019 05:23)  HR: 81 (24 Jun 2019 05:23) (81 - 92)  BP: 110/69 (24 Jun 2019 05:23) (110/69 - 116/74)  BP(mean): --  RR: 17 (24 Jun 2019 05:23) (17 - 18)  SpO2: 97% (24 Jun 2019 05:23) (96% - 97%)

## 2019-06-24 NOTE — PROGRESS NOTE ADULT - PROBLEM SELECTOR PLAN 4
-Chronic LBP.   -Would avoid opiates for now.   -Tylenol PRN.   -Lidocaine patch added.  -Will give ibuprofen 600 mg PO x 1 today and assess response.
-Chronic LBP.   -Would avoid opiates for now.   -Tylenol PRN.   -Can also try lidocaine patch.
-Chronic LBP.   -Would avoid opiates for now.   -Tylenol PRN.   -Can also try lidocaine patch.

## 2019-06-24 NOTE — DISCHARGE NOTE NURSING/CASE MANAGEMENT/SOCIAL WORK - NSDCDPATPORTLINK_GEN_ALL_CORE
You can access the [facility] Patient Portal, offered by [facility], by registering with the following website: [email or web address]

## 2019-06-24 NOTE — PROGRESS NOTE ADULT - PROBLEM SELECTOR PLAN 2
-has been having multiple falls at home. Per patient, they were mechanical.   -no fx here on imaging   -B12 normal, Vitamin D moderately low. Start vitamin D 1000 IU PO daily.  -Orthostatics negative.  -Fall precautions.   -PT eval - await recommendations.
-has been having multiple falls at home  -no fx here on imaging   -B12 normal, will check vitamin D  -Will check orthostatics; so far negative.   -Fall precautions.   -PT elizabeth
-has been having multiple falls at home. Per patient, they were mechanical.   -no fx here on imaging   -B12 normal, Vitamin D - testing  -Will check orthostatics; so far negative.   -Fall precautions.   -PT marianneal

## 2019-07-04 ENCOUNTER — TRANSCRIPTION ENCOUNTER (OUTPATIENT)
Age: 64
End: 2019-07-04

## 2019-07-23 PROBLEM — F32.9 MAJOR DEPRESSIVE DISORDER, SINGLE EPISODE, UNSPECIFIED: Chronic | Status: ACTIVE | Noted: 2019-06-21

## 2019-07-23 PROBLEM — F43.10 POST-TRAUMATIC STRESS DISORDER, UNSPECIFIED: Chronic | Status: ACTIVE | Noted: 2019-06-21

## 2019-07-23 PROBLEM — F41.9 ANXIETY DISORDER, UNSPECIFIED: Chronic | Status: ACTIVE | Noted: 2019-06-21

## 2019-07-30 ENCOUNTER — OUTPATIENT (OUTPATIENT)
Dept: OUTPATIENT SERVICES | Facility: HOSPITAL | Age: 64
LOS: 1 days | End: 2019-07-30
Payer: MEDICARE

## 2019-07-30 ENCOUNTER — APPOINTMENT (OUTPATIENT)
Dept: MRI IMAGING | Facility: CLINIC | Age: 64
End: 2019-07-30
Payer: MEDICARE

## 2019-07-30 DIAGNOSIS — Z00.8 ENCOUNTER FOR OTHER GENERAL EXAMINATION: ICD-10-CM

## 2019-07-30 PROCEDURE — 73721 MRI JNT OF LWR EXTRE W/O DYE: CPT | Mod: 26,RT

## 2019-07-30 PROCEDURE — 72148 MRI LUMBAR SPINE W/O DYE: CPT | Mod: 26

## 2019-07-30 PROCEDURE — 72148 MRI LUMBAR SPINE W/O DYE: CPT

## 2019-07-30 PROCEDURE — 73721 MRI JNT OF LWR EXTRE W/O DYE: CPT

## 2020-09-24 ENCOUNTER — TRANSCRIPTION ENCOUNTER (OUTPATIENT)
Age: 65
End: 2020-09-24

## 2021-04-07 ENCOUNTER — APPOINTMENT (OUTPATIENT)
Dept: DISASTER EMERGENCY | Facility: OTHER | Age: 66
End: 2021-04-07

## 2023-04-22 NOTE — DISCHARGE NOTE PROVIDER - NSCORESITESY/N_GEN_A_CORE_RD
No Patient was seen and examined at bedside on 4/22/2023 at 11 am. Patient has no acute complaints. Denies SOB, abd pain, CP. ROS is otherwise negative. Vitals, labwork and pertinent imaging reviewed. Physical exam - NAD, AAO x 2, PERRLA, EOMI, MMM, supple neck, chest - CTA b/l, CV - rrr, s1s2, no m/r/g, abd - soft, NTND, + BS, ext - wwp, psych - normal affect, skin - no rash    Plan:  -PT eval  -Add on D - dimer and BNP although low likelihood  -Add ASA, consider increase of Statin  -Pt is likely medically ready for d/c today

## 2024-06-04 NOTE — PHYSICAL THERAPY INITIAL EVALUATION ADULT - NAME OF DISCHARGE PLANNER
Preparing for Surgery  Follow these instructions before the procedure:  Several days or weeks before your procedure    Ask your health care provider about:  Changing or stopping your regular medicines. This is especially important if you are taking diabetes medicines or blood thinners.  Taking medicines such as aspirin and ibuprofen. These medicines can thin your blood. Do not take these medicines unless your health care provider tells you to take them.  Taking over-the-counter medicines, vitamins, herbs, and supplements.    Contact your surgeon if you:  Develop a fever of more than 100.4°F (38°C) or other feelings of illness during the 48 hours before your surgery.  Have symptoms that get worse.  Have questions or concerns about your surgery.  If you are going home the same day of your surgery you will need to arrange for a responsible adult, age 18 years old or older, to drive you home from the hospital and stay with you for 24 hours. Verification of the  will be made prior to any procedure requiring sedation. You may not go home in a taxi or any form of public transportation by yourself.     Day before your procedure  Medication(s) you need to stop the day before your surgery: lisinopril     Use bactroban at 5 pm night before your surgery  24 hours before your procedure DO NOT drink alcoholic beverages or smoke.  24 hours before your procedure STOP taking Erectile Dysfunction medication (i.e.,Cialis, Viagra)   You may be asked to shower with a germ-killing soap.  Day of your procedure   NO MEDICATIONS ON AM OF SURGERY    8 hours before your procedure STOP all food, any dairy products, and full liquids. This includes hard candy, chewing gum or mints. This is extremely important to prevent serious complications.     Up to 2 hours before your scheduled arrival time, you may have clear liquids no cream, powder, or pulp of any kind. Safe options are water, black coffee, plain tea, soda, Gatorade/Powerade, clear  broth, apple juice.    2 hours before your scheduled arrival time, STOP drinking clear liquids.    You may need to take another shower with a germ-killing soap before you leave home in the morning. Do not use perfumes, colognes, or body lotions.  Wear comfortable loose-fitting clothing.  Remove all jewelry including body piercing and rings, dark colored nail polish, and make up prior to arrival at the hospital. Leave all valuables at home.   Bring your hearing aids if you rely on them.  Do not wear contact lenses. If you wear eyeglasses remember to bring a case to store them in while you are in surgery.  Do not use denture adhesives since you will be asked to remove them during your surgery.    You do not need to bring your home medications into the hospital.   Bring your sleep apnea device with you on the day of your surgery (if this applies to you).  If you wear portable oxygen, bring it with you.   If you are staying overnight, you may bring a bag of items you may need such as slippers, robe and a change of clothes for your discharge. You may want to leave these items in the car until you are ready for them since your family will take your belongings when you leave the pre-operative area.  Arrive at the hospital as scheduled by the office. You will be asked to arrive 2 hours prior to your surgery time in order to prepare for your procedure.  When you arrive at the hospital  Go to the registration desk located at the main entrance of the hospital.  After registration is completed, you will be given a beeper and a sticker sheet. Take the stickers to Outpatient Surgery and place in the tray at the end of the desk to notify the staff that you have arrived and registered.   Return to the lobby to wait. You are not always called back according to the time of arrival but rather the time your doctor will be ready.  When your beeper lights up and vibrates proceed through the double doors, under the stairs, and a member of  the Outpatient Surgery staff will escort you to your preoperative room.   How to Use Chlorhexidine Before Surgery  Chlorhexidine gluconate (CHG) is a germ-killing (antiseptic) solution that is used to clean the skin. It can get rid of the bacteria that normally live on the skin and can keep them away for about 24 hours. To clean your skin with CHG, you may be given:  A CHG solution to use in the shower or as part of a sponge bath.  A prepackaged cloth that contains CHG.  Cleaning your skin with CHG may help lower the risk for infection:  While you are staying in the intensive care unit of the hospital.  If you have a vascular access, such as a central line, to provide short-term or long-term access to your veins.  If you have a catheter to drain urine from your bladder.  If you are on a ventilator. A ventilator is a machine that helps you breathe by moving air in and out of your lungs.  After surgery.  What are the risks?  Risks of using CHG include:  A skin reaction.  Hearing loss, if CHG gets in your ears and you have a perforated eardrum.  Eye injury, if CHG gets in your eyes and is not rinsed out.  The CHG product catching fire.  Make sure that you avoid smoking and flames after applying CHG to your skin.  Do not use CHG:  If you have a chlorhexidine allergy or have previously reacted to chlorhexidine.  On babies younger than 2 months of age.  How to use CHG solution  Use CHG only as told by your health care provider, and follow the instructions on the label.  Use the full amount of CHG as directed. Usually, this is one bottle.  During a shower    Follow these steps when using CHG solution during a shower (unless your health care provider gives you different instructions):  Start the shower.  Use your normal soap and shampoo to wash your face and hair.  Turn off the shower or move out of the shower stream.  Pour the CHG onto a clean washcloth. Do not use any type of brush or rough-edged sponge.  Starting at your  neck, lather your body down to your toes. Make sure you follow these instructions:  If you will be having surgery, pay special attention to the part of your body where you will be having surgery. Scrub this area for at least 1 minute.  Do not use CHG on your head or face. If the solution gets into your ears or eyes, rinse them well with water.  Avoid your genital area.  Avoid any areas of skin that have broken skin, cuts, or scrapes.  Scrub your back and under your arms. Make sure to wash skin folds.  Let the lather sit on your skin for 1-2 minutes or as long as told by your health care provider.  Thoroughly rinse your entire body in the shower. Make sure that all body creases and crevices are rinsed well.  Dry off with a clean towel. Do not put any substances on your body afterward--such as powder, lotion, or perfume--unless you are told to do so by your health care provider. Only use lotions that are recommended by the .  Put on clean clothes or pajamas.  If it is the night before your surgery, sleep in clean sheets.     During a sponge bath  Follow these steps when using CHG solution during a sponge bath (unless your health care provider gives you different instructions):  Use your normal soap and shampoo to wash your face and hair.  Pour the CHG onto a clean washcloth.  Starting at your neck, lather your body down to your toes. Make sure you follow these instructions:  If you will be having surgery, pay special attention to the part of your body where you will be having surgery. Scrub this area for at least 1 minute.  Do not use CHG on your head or face. If the solution gets into your ears or eyes, rinse them well with water.  Avoid your genital area.  Avoid any areas of skin that have broken skin, cuts, or scrapes.  Scrub your back and under your arms. Make sure to wash skin folds.  Let the lather sit on your skin for 1-2 minutes or as long as told by your health care provider.  Using a different  clean, wet washcloth, thoroughly rinse your entire body. Make sure that all body creases and crevices are rinsed well.  Dry off with a clean towel. Do not put any substances on your body afterward--such as powder, lotion, or perfume--unless you are told to do so by your health care provider. Only use lotions that are recommended by the .  Put on clean clothes or pajamas.  If it is the night before your surgery, sleep in clean sheets.  How to use CHG prepackaged cloths  Only use CHG cloths as told by your health care provider, and follow the instructions on the label.  Use the CHG cloth on clean, dry skin.  Do not use the CHG cloth on your head or face unless your health care provider tells you to.  When washing with the CHG cloth:  Avoid your genital area.  Avoid any areas of skin that have broken skin, cuts, or scrapes.  Before surgery    Follow these steps when using a CHG cloth to clean before surgery (unless your health care provider gives you different instructions):  Using the CHG cloth, vigorously scrub the part of your body where you will be having surgery. Scrub using a back-and-forth motion for 3 minutes. The area on your body should be completely wet with CHG when you are done scrubbing.  Do not rinse. Discard the cloth and let the area air-dry. Do not put any substances on the area afterward, such as powder, lotion, or perfume.  Put on clean clothes or pajamas.  If it is the night before your surgery, sleep in clean sheets.     For general bathing  Follow these steps when using CHG cloths for general bathing (unless your health care provider gives you different instructions).  Use a separate CHG cloth for each area of your body. Make sure you wash between any folds of skin and between your fingers and toes. Wash your body in the following order, switching to a new cloth after each step:  The front of your neck, shoulders, and chest.  Both of your arms, under your arms, and your hands.  Your  stomach and groin area, avoiding the genitals.  Your right leg and foot.  Your left leg and foot.  The back of your neck, your back, and your buttocks.  Do not rinse. Discard the cloth and let the area air-dry. Do not put any substances on your body afterward--such as powder, lotion, or perfume--unless you are told to do so by your health care provider. Only use lotions that are recommended by the .  Put on clean clothes or pajamas.  Contact a health care provider if:  Your skin gets irritated after scrubbing.  You have questions about using your solution or cloth.  You swallow any chlorhexidine. Call your local poison control center (1-172.793.7615 in the [place]  Get help right away if:  Your eyes itch badly, or they become very red or swollen.  Your skin itches badly and is red or swollen.  Your hearing changes.  You have trouble seeing.  You have swelling or tingling in your mouth or throat.  You have trouble breathing.  These symptoms may represent a serious problem that is an emergency. Do not wait to see if the symptoms will go away. Get medical help right away. Call your local emergency services (991 in the [place] Do not drive yourself to the hospital.  Summary  Chlorhexidine gluconate (CHG) is a germ-killing (antiseptic) solution that is used to clean the skin. Cleaning your skin with CHG may help to lower your risk for infection.  You may be given CHG to use for bathing. It may be in a bottle or in a prepackaged cloth to use on your skin. Carefully follow your health care provider's instructions and the instructions on the product label.  Do not use CHG if you have a chlorhexidine allergy.  Contact your health care provider if your skin gets irritated after scrubbing.  This information is not intended to replace advice given to you by your health care provider. Make sure you discuss any questions you have with your health care provider.  Document Revised: 04/17/2023 Document Reviewed:  02/28/2022  [employer]vier Patient Education © 2023 [employer]         Viry Martell/ [place], Jorge THOMPSON